# Patient Record
Sex: FEMALE | Race: BLACK OR AFRICAN AMERICAN | NOT HISPANIC OR LATINO | Employment: FULL TIME | URBAN - METROPOLITAN AREA
[De-identification: names, ages, dates, MRNs, and addresses within clinical notes are randomized per-mention and may not be internally consistent; named-entity substitution may affect disease eponyms.]

---

## 2017-05-25 ENCOUNTER — HOSPITAL ENCOUNTER (EMERGENCY)
Facility: HOSPITAL | Age: 42
Discharge: HOME | End: 2017-05-25
Attending: EMERGENCY MEDICINE
Payer: MEDICAID

## 2017-05-25 VITALS
HEART RATE: 85 BPM | HEIGHT: 64 IN | DIASTOLIC BLOOD PRESSURE: 90 MMHG | RESPIRATION RATE: 16 BRPM | WEIGHT: 200 LBS | TEMPERATURE: 99 F | SYSTOLIC BLOOD PRESSURE: 135 MMHG | BODY MASS INDEX: 34.15 KG/M2 | OXYGEN SATURATION: 100 %

## 2017-05-25 DIAGNOSIS — M25.512 ACUTE PAIN OF LEFT SHOULDER: ICD-10-CM
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
## 2017-05-25 DIAGNOSIS — M54.2 NECK PAIN: ICD-10-CM
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
## 2017-05-25 DIAGNOSIS — T30.4 CHEMICAL BURN OF SKIN: ICD-10-CM

## 2017-05-25 DIAGNOSIS — S30.1XXA CONTUSION OF ABDOMINAL WALL, INITIAL ENCOUNTER: ICD-10-CM

## 2017-05-25 DIAGNOSIS — S16.1XXA CERVICAL MUSCLE STRAIN, INITIAL ENCOUNTER: Primary | ICD-10-CM

## 2017-05-25 LAB
B-HCG UR QL: NEGATIVE
CTP QC/QA: YES

## 2017-05-25 PROCEDURE — 25000003 PHARM REV CODE 250: Performed by: PHYSICIAN ASSISTANT

## 2017-05-25 PROCEDURE — 99284 EMERGENCY DEPT VISIT MOD MDM: CPT

## 2017-05-25 PROCEDURE — 81025 URINE PREGNANCY TEST: CPT | Performed by: PHYSICIAN ASSISTANT

## 2017-05-25 RX ORDER — METHOCARBAMOL 500 MG/1
500 TABLET, FILM COATED ORAL
Status: COMPLETED | OUTPATIENT
Start: 2017-05-25 | End: 2017-05-25

## 2017-05-25 RX ORDER — IBUPROFEN 600 MG/1
600 TABLET ORAL EVERY 6 HOURS PRN
Qty: 20 TABLET | Refills: 0 | Status: SHIPPED | OUTPATIENT
Start: 2017-05-25 | End: 2019-02-28

## 2017-05-25 RX ORDER — CYCLOBENZAPRINE HCL 10 MG
10 TABLET ORAL 3 TIMES DAILY PRN
Qty: 15 TABLET | Refills: 0 | Status: SHIPPED | OUTPATIENT
Start: 2017-05-25 | End: 2017-05-30

## 2017-05-25 RX ORDER — IBUPROFEN 600 MG/1
600 TABLET ORAL
Status: COMPLETED | OUTPATIENT
Start: 2017-05-25 | End: 2017-05-25

## 2017-05-25 RX ADMIN — IBUPROFEN 600 MG: 600 TABLET, FILM COATED ORAL at 02:05

## 2017-05-25 RX ADMIN — METHOCARBAMOL 500 MG: 500 TABLET ORAL at 02:05
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
## 2019-02-28 ENCOUNTER — HOSPITAL ENCOUNTER (EMERGENCY)
Facility: HOSPITAL | Age: 44
Discharge: HOME | End: 2019-02-28
Attending: EMERGENCY MEDICINE
Payer: MEDICAID

## 2019-02-28 VITALS
HEART RATE: 89 BPM | BODY MASS INDEX: 28 KG/M2 | WEIGHT: 164 LBS | HEIGHT: 64 IN | TEMPERATURE: 99 F | RESPIRATION RATE: 19 BRPM | OXYGEN SATURATION: 99 % | SYSTOLIC BLOOD PRESSURE: 150 MMHG | DIASTOLIC BLOOD PRESSURE: 78 MMHG

## 2019-02-28 DIAGNOSIS — B34.9 VIRAL SYNDROME: Primary | ICD-10-CM

## 2019-02-28 LAB
B-HCG UR QL: NEGATIVE
CTP QC/QA: YES
CTP QC/QA: YES
DEPRECATED S PYO AG THROAT QL EIA: NEGATIVE
FLUAV AG NPH QL: NEGATIVE
FLUBV AG NPH QL: NEGATIVE

## 2019-02-28 PROCEDURE — 25000003 PHARM REV CODE 250: Performed by: PHYSICIAN ASSISTANT

## 2019-02-28 PROCEDURE — 81025 URINE PREGNANCY TEST: CPT | Performed by: PHYSICIAN ASSISTANT

## 2019-02-28 PROCEDURE — 63600175 PHARM REV CODE 636 W HCPCS: Performed by: PHYSICIAN ASSISTANT

## 2019-02-28 PROCEDURE — 96372 THER/PROPH/DIAG INJ SC/IM: CPT

## 2019-02-28 PROCEDURE — 99284 EMERGENCY DEPT VISIT MOD MDM: CPT | Mod: 25

## 2019-02-28 PROCEDURE — 87880 STREP A ASSAY W/OPTIC: CPT

## 2019-02-28 PROCEDURE — 87081 CULTURE SCREEN ONLY: CPT

## 2019-02-28 PROCEDURE — 87804 INFLUENZA ASSAY W/OPTIC: CPT | Mod: 59

## 2019-02-28 RX ORDER — DEXAMETHASONE SODIUM PHOSPHATE 4 MG/ML
12 INJECTION, SOLUTION INTRA-ARTICULAR; INTRALESIONAL; INTRAMUSCULAR; INTRAVENOUS; SOFT TISSUE
Status: COMPLETED | OUTPATIENT
Start: 2019-02-28 | End: 2019-02-28

## 2019-02-28 RX ORDER — LORATADINE 10 MG/1
10 TABLET ORAL DAILY
Qty: 30 TABLET | Refills: 0 | Status: SHIPPED | OUTPATIENT
Start: 2019-02-28 | End: 2020-08-07

## 2019-02-28 RX ORDER — AZELASTINE 1 MG/ML
1 SPRAY, METERED NASAL 2 TIMES DAILY
Qty: 30 ML | Refills: 0 | Status: SHIPPED | OUTPATIENT
Start: 2019-02-28 | End: 2021-01-20

## 2019-02-28 RX ORDER — GUAIFENESIN/DEXTROMETHORPHAN 100-10MG/5
10 SYRUP ORAL 4 TIMES DAILY PRN
Qty: 120 ML | Refills: 0 | Status: SHIPPED | OUTPATIENT
Start: 2019-02-28 | End: 2019-03-10

## 2019-02-28 RX ORDER — IBUPROFEN 600 MG/1
600 TABLET ORAL EVERY 6 HOURS PRN
Qty: 20 TABLET | Refills: 0 | Status: SHIPPED | OUTPATIENT
Start: 2019-02-28 | End: 2020-08-07

## 2019-02-28 RX ADMIN — Medication 10 ML: at 09:02

## 2019-02-28 RX ADMIN — DEXAMETHASONE SODIUM PHOSPHATE 12 MG: 4 INJECTION, SOLUTION INTRAMUSCULAR; INTRAVENOUS at 09:02

## 2019-02-28 NOTE — ED PROVIDER NOTES
Encounter Date: 2/28/2019       History     Chief Complaint   Patient presents with    Cough     since Saturday with headache; nasal congestion; bodyaches and sore throat     44-year-old female with no significant past medical history on file presents to ED complaining of bitemporal headache, nasal congestion, rhinorrhea, cough productive of yellow/brown sputum, sore throat, myalgias.  Patient states she began on Saturday with a bitemporal headache and some nasal congestion.  Over the course the week she developed a productive cough, myalgias.  She admits to chills without fever.  She is also complaining of sore throat, odynophagia without dysphagia.  No chest pain.  No shortness of breath or dyspnea on exertion.  No abdominal pain.  No urinary complaints. Symptoms are acute, constant, severity 5/10.  No neck pain or stiffness. No recent illness.  No known sick contacts.  No recent hospitalization.  No recent antibiotics.    No history DVT or PE.  No unilateral leg swelling or pain.          Review of patient's allergies indicates:  No Known Allergies  History reviewed. No pertinent past medical history.  History reviewed. No pertinent surgical history.  History reviewed. No pertinent family history.  Social History     Tobacco Use    Smoking status: Never Smoker    Smokeless tobacco: Never Used   Substance Use Topics    Alcohol use: No    Drug use: No     Review of Systems   Constitutional: Positive for chills. Negative for fever.   HENT: Positive for congestion, rhinorrhea and sore throat. Negative for ear discharge, ear pain, facial swelling and trouble swallowing.    Eyes: Negative.  Negative for photophobia, pain, discharge, redness and visual disturbance.   Respiratory: Positive for cough (Productive). Negative for shortness of breath and wheezing.    Cardiovascular: Negative for chest pain, palpitations and leg swelling.   Gastrointestinal: Negative for abdominal pain, nausea and vomiting.   Endocrine:  Negative.    Genitourinary: Negative for dysuria, flank pain, frequency, hematuria, pelvic pain, vaginal bleeding, vaginal discharge and vaginal pain.   Musculoskeletal: Positive for myalgias. Negative for back pain, neck pain and neck stiffness.   Skin: Negative for rash.   Neurological: Positive for headaches. Negative for dizziness, weakness and light-headedness.   Hematological: Does not bruise/bleed easily.   Psychiatric/Behavioral: Negative.    All other systems reviewed and are negative.      Physical Exam     Initial Vitals [02/28/19 0815]   BP Pulse Resp Temp SpO2   (!) 144/84 90 20 98.9 °F (37.2 °C) 99 %      MAP       --         Physical Exam    Nursing note and vitals reviewed.  Constitutional: She appears well-developed and well-nourished. She is not diaphoretic. No distress.   She is well-appearing and nontoxic.  Vitals are reassuring.  She does speak with a hoarse voice.   HENT:   Head: Normocephalic and atraumatic.   There is posterior or pharyngeal erythema.  There is tonsillar erythema without significant swelling or exudate.  No asymmetry. No uvular deviation.  Airway patent without stridor.  There is mild anterior cervical lymphadenopathy bilaterally.  Neck remains supple.  Bilateral TMs and ear canals within normal limits.   Eyes: Conjunctivae and EOM are normal. Pupils are equal, round, and reactive to light.   Neck: Normal range of motion. Neck supple. No tracheal deviation present.   Cardiovascular: Intact distal pulses.   Pulmonary/Chest: Breath sounds normal. No stridor. No respiratory distress. She has no wheezes. She has no rhonchi. She exhibits no tenderness.   No hypoxia on room air.  No tachypnea.  No accessory muscle use.   Abdominal: Soft. Bowel sounds are normal. She exhibits no distension. There is no tenderness.   Musculoskeletal: Normal range of motion. She exhibits no tenderness.   Lymphadenopathy:     She has no cervical adenopathy.   Neurological: She is alert and oriented to  person, place, and time. GCS score is 15. GCS eye subscore is 4. GCS verbal subscore is 5. GCS motor subscore is 6.    strength equal bilaterally. Biceps, triceps, hip flexion, knee flexion, knee extension grossly equal bilaterally. Normal finger to nose bilaterally. No pronator drift, negative Romberg.  Normal rapid alternating movement.  Normal, steady gait.   Skin: Skin is warm and dry. Capillary refill takes less than 2 seconds.   Psychiatric: She has a normal mood and affect. Her behavior is normal. Judgment and thought content normal.         ED Course   Procedures  Labs Reviewed   THROAT SCREEN, RAPID   CULTURE, STREP A,  THROAT   POCT INFLUENZA A/B   POCT URINE PREGNANCY          Imaging Results    None          Medical Decision Making:   ED Management:  Differential includes viral illness, pharyngitis, pneumonia, bronchitis, meningitis, headache disorder unspecified, dehydration    Likely viral illness.  Pharynx with some erythema.  Centor score 1.  Lungs clear.  No respiratory distress.  Vitals reassuring.  Bitemporal headache without any focal neurologic deficits.  Likely related to viral illness.  Lying will manage supportively as an outpatient, have her follow up with her primary.  I discussed strict return precautions.  She does understand and agree.                      Clinical Impression:       ICD-10-CM ICD-9-CM   1. Viral syndrome B34.9 079.99         Disposition:   Disposition: Discharged  Condition: Stable                        Grabiel Juarez PA-C  02/28/19 [id number]

## 2019-02-28 NOTE — DISCHARGE INSTRUCTIONS
Drink lots of fluids, stay well hydrated. Tylenol/Ibuprofen as needed for discomfort; go back and forth between these two medications as needed for temp greater than 100.4F. Zyrtec daily. Astelin for congestion. Robitussin for cough. Follow-up with primary care provider for reevaluation, further recommendations. Return to this ED if unable to treat fever, if symptoms persist or worsen despite treatment, if you begin with shortness of breath or difficulty breathing, if any other problems occur.

## 2019-02-28 NOTE — ED TRIAGE NOTES
The pt states she started with a headache on last Saturday. Reports the cough, sore throat and runny nose started on Tuesday. Says she felt warm.

## 2019-03-02 LAB — BACTERIA THROAT CULT: NORMAL

## 2020-02-13 ENCOUNTER — HOSPITAL ENCOUNTER (EMERGENCY)
Facility: HOSPITAL | Age: 45
Discharge: HOME | End: 2020-02-13
Attending: EMERGENCY MEDICINE
Payer: COMMERCIAL

## 2020-02-13 VITALS
BODY MASS INDEX: 25.75 KG/M2 | WEIGHT: 150 LBS | OXYGEN SATURATION: 100 % | DIASTOLIC BLOOD PRESSURE: 91 MMHG | TEMPERATURE: 98 F | RESPIRATION RATE: 16 BRPM | HEART RATE: 81 BPM | SYSTOLIC BLOOD PRESSURE: 149 MMHG

## 2020-02-13 DIAGNOSIS — S30.1XXA CONTUSION OF FLANK, INITIAL ENCOUNTER: ICD-10-CM

## 2020-02-13 DIAGNOSIS — S20.219A CONTUSION OF CHEST WALL, UNSPECIFIED LATERALITY, INITIAL ENCOUNTER: Primary | ICD-10-CM

## 2020-02-13 DIAGNOSIS — S46.812A TRAPEZIUS STRAIN, LEFT, INITIAL ENCOUNTER: ICD-10-CM
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
## 2020-02-13 LAB
B-HCG UR QL: NEGATIVE
CTP QC/QA: YES

## 2020-02-13 PROCEDURE — 63600175 PHARM REV CODE 636 W HCPCS: Performed by: PHYSICIAN ASSISTANT

## 2020-02-13 PROCEDURE — 99284 EMERGENCY DEPT VISIT MOD MDM: CPT | Mod: 25

## 2020-02-13 PROCEDURE — 81025 URINE PREGNANCY TEST: CPT | Performed by: EMERGENCY MEDICINE

## 2020-02-13 PROCEDURE — 25000003 PHARM REV CODE 250: Performed by: PHYSICIAN ASSISTANT

## 2020-02-13 PROCEDURE — 96372 THER/PROPH/DIAG INJ SC/IM: CPT

## 2020-02-13 RX ORDER — KETOROLAC TROMETHAMINE 30 MG/ML
30 INJECTION, SOLUTION INTRAMUSCULAR; INTRAVENOUS
Status: COMPLETED | OUTPATIENT
Start: 2020-02-13 | End: 2020-02-13

## 2020-02-13 RX ORDER — LIDOCAINE 50 MG/G
1 PATCH TOPICAL DAILY
Qty: 6 PATCH | Refills: 0 | Status: SHIPPED | OUTPATIENT
Start: 2020-02-13 | End: 2020-08-07

## 2020-02-13 RX ORDER — ORPHENADRINE CITRATE 100 MG/1
100 TABLET, EXTENDED RELEASE ORAL 2 TIMES DAILY
Qty: 8 TABLET | Refills: 0 | Status: SHIPPED | OUTPATIENT
Start: 2020-02-13 | End: 2020-02-17

## 2020-02-13 RX ORDER — MELOXICAM 7.5 MG/1
7.5 TABLET ORAL DAILY
Qty: 12 TABLET | Refills: 0 | Status: SHIPPED | OUTPATIENT
Start: 2020-02-13 | End: 2020-08-07

## 2020-02-13 RX ORDER — ACETAMINOPHEN 500 MG
1000 TABLET ORAL
Status: COMPLETED | OUTPATIENT
Start: 2020-02-13 | End: 2020-02-13

## 2020-02-13 RX ADMIN — KETOROLAC TROMETHAMINE 30 MG: 30 INJECTION, SOLUTION INTRAMUSCULAR at 07:02

## 2020-02-13 RX ADMIN — ACETAMINOPHEN 1000 MG: 500 TABLET ORAL at 07:02
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
## 2020-02-14 NOTE — DISCHARGE INSTRUCTIONS
Your feedback is important to us.  If you should receive a survey in the next few days, please share your experience with us.        Thank you for coming to our Emergency Department today. It is important to remember that some problems are difficult to diagnose and may not be found during your first visit. Be sure to follow up with your primary care doctor.    Return to the ER with any questions/concerns, new/concerning symptoms, worsening or failure to improve. Do not drive or make any important decisions for 24 hours if you have received any pain medications, sedatives or mood altering drugs during your ER visit.
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
## 2020-08-07 ENCOUNTER — OFFICE VISIT (OUTPATIENT)
Dept: FAMILY MEDICINE | Facility: CLINIC | Age: 45
End: 2020-08-07
Payer: COMMERCIAL

## 2020-08-07 VITALS
RESPIRATION RATE: 16 BRPM | BODY MASS INDEX: 28.38 KG/M2 | SYSTOLIC BLOOD PRESSURE: 120 MMHG | WEIGHT: 166.25 LBS | DIASTOLIC BLOOD PRESSURE: 86 MMHG | HEIGHT: 64 IN | OXYGEN SATURATION: 98 % | TEMPERATURE: 100 F | HEART RATE: 97 BPM

## 2020-08-07 DIAGNOSIS — Z76.89 ESTABLISHING CARE WITH NEW DOCTOR, ENCOUNTER FOR: ICD-10-CM

## 2020-08-07 DIAGNOSIS — R92.8 ABNORMAL MAMMOGRAM OF LEFT BREAST: ICD-10-CM

## 2020-08-07 DIAGNOSIS — Z00.00 ROUTINE GENERAL MEDICAL EXAMINATION AT HEALTH CARE FACILITY: ICD-10-CM

## 2020-08-07 PROCEDURE — 99999 PR PBB SHADOW E&M-EST. PATIENT-LVL IV: ICD-10-PCS | Mod: PBBFAC,,, | Performed by: INTERNAL MEDICINE

## 2020-08-07 PROCEDURE — 3008F PR BODY MASS INDEX (BMI) DOCUMENTED: ICD-10-PCS | Mod: CPTII,S$GLB,, | Performed by: INTERNAL MEDICINE

## 2020-08-07 PROCEDURE — 99204 PR OFFICE/OUTPT VISIT, NEW, LEVL IV, 45-59 MIN: ICD-10-PCS | Mod: S$GLB,,, | Performed by: INTERNAL MEDICINE

## 2020-08-07 PROCEDURE — 99999 PR PBB SHADOW E&M-EST. PATIENT-LVL IV: CPT | Mod: PBBFAC,,, | Performed by: INTERNAL MEDICINE

## 2020-08-07 PROCEDURE — 99204 OFFICE O/P NEW MOD 45 MIN: CPT | Mod: S$GLB,,, | Performed by: INTERNAL MEDICINE

## 2020-08-07 PROCEDURE — 3008F BODY MASS INDEX DOCD: CPT | Mod: CPTII,S$GLB,, | Performed by: INTERNAL MEDICINE

## 2020-08-07 NOTE — PROGRESS NOTES
Subjective:       Patient ID: Karey Palmer is a pleasant 45 y.o. Black or  female patient    Chief Complaint: Orders (diagnostic mammogram - due to abdominal mammogram from [facility] )      Patient is a new pt to me and to our practice.    HPI    She comes may need today because she needs an order for a diagnostic mammography to be done at [facility] as she had a screening one at [facility] with the need of additional imaging on left side but could not be done as the machine broke.  She would also like to organize an annual physical as well as be referred to OBGYN.        There is no problem list on file for this patient.         ACTIVE MEDICAL ISSUES:  Documented in Problem List     PAST MEDICAL HISTORY  Documented     PAST SURGICAL HISTORY:  Documented     SOCIAL HISTORY:  Documented     FAMILY HISTORY:  Documented     ALLERGIES AND MEDICATIONS: updated and reviewed.  Documented    Review of Systems   Constitutional: Negative for activity change, appetite change, fatigue and fever.   HENT: Negative for congestion, postnasal drip, rhinorrhea, sinus pressure and sore throat.    Eyes: Negative for pain, discharge and redness.   Respiratory: Negative for cough, chest tightness and shortness of breath.    Cardiovascular: Negative for chest pain, palpitations and leg swelling.   Gastrointestinal: Negative for abdominal pain, constipation and nausea.   Endocrine: Negative for cold intolerance and heat intolerance.   Genitourinary: Negative for difficulty urinating, flank pain, frequency, menstrual problem, pelvic pain, urgency and vaginal discharge.   Musculoskeletal: Negative for arthralgias, back pain, joint swelling and neck pain.   Skin: Negative for color change and rash.   Allergic/Immunologic: Negative for environmental allergies and food allergies.   Neurological: Negative for weakness, numbness and headaches.   Hematological: Negative for adenopathy.   Psychiatric/Behavioral: Negative for behavioral  "problems, hallucinations, sleep disturbance and suicidal ideas. The patient is not nervous/anxious.        Objective:      Physical Exam  Vitals signs and nursing note reviewed.   Constitutional:       Appearance: She is well-developed.   HENT:      Right Ear: External ear normal.      Left Ear: External ear normal.      Nose: Nose normal.   Eyes:      Conjunctiva/sclera: Conjunctivae normal.      Pupils: Pupils are equal, round, and reactive to light.   Neck:      Musculoskeletal: Normal range of motion and neck supple.      Thyroid: No thyromegaly.   Cardiovascular:      Rate and Rhythm: Normal rate and regular rhythm.      Heart sounds: Normal heart sounds.   Pulmonary:      Effort: Pulmonary effort is normal. No respiratory distress.      Breath sounds: Normal breath sounds. No wheezing.   Abdominal:      General: Bowel sounds are normal.      Palpations: Abdomen is soft. There is no mass.      Tenderness: There is no abdominal tenderness.   Genitourinary:     Vagina: No vaginal discharge.   Musculoskeletal: Normal range of motion.   Lymphadenopathy:      Cervical: No cervical adenopathy.   Skin:     General: Skin is warm and dry.   Neurological:      Mental Status: She is alert and oriented to person, place, and time.   Psychiatric:         Behavior: Behavior normal.         Thought Content: Thought content normal.         Judgment: Judgment normal.         Vitals:    08/07/20 1341   BP: 120/86   BP Location: Right arm   Patient Position: Sitting   BP Method: Large (Manual)   Pulse: 97   Resp: 16   Temp: 100.1 °F (37.8 °C)   TempSrc: Oral   SpO2: 98%   Weight: 75.4 kg (166 lb 3.6 oz)   Height: 5' 4" (1.626 m)     Body mass index is 28.53 kg/m².    RESULTS: all lab work in Epic.     Mammo screening 7/27/2020:        FINDINGS RIGHT BREAST: There are scattered areas of fibroglandular density.  The fibroglandular pattern is stable. There is no significant mass, suspicious microcalcifications, or secondary signs of " malignancy in right breast.        FINDINGS LEFT BREAST: There are scattered areas of fibroglandular density. The fibroglandular pattern is stable. At the 3 o'clock position deep in the medial left breast there is a question subtle spiculated lesion which was not evident on the previous mammogram. Recommend diagnostic mammogram with spot view and if the finding persists ultrasound for characterization.        Assessment:       1. Establishing care with new doctor, encounter for    2. Abnormal mammogram of left breast    3. Routine general medical examination at health care facility        Plan:   Karey was seen today for orders.    Diagnoses and all orders for this visit:    Establishing care with new doctor, encounter for    Will come and see me back in August for an annual physical.    Abnormal mammogram of left breast  -     Mammo Digital Diagnostic Left; Future    Had a screening mammo done at [facility] and is supposed to have additional imaging done for left breast, not possible as machine broke.  Ordered at our facility.    Routine general medical examination at health care facility  -     Ambulatory referral/consult to Obstetrics / Gynecology; Future  -     CBC auto differential; Future  -     Comprehensive metabolic panel; Future  -     Hemoglobin A1C; Future  -     TSH; Future  -     Lipid Panel; Future    Patient will do blood work before the next visit and will discuss the results as well as other preventative measures.  I will also refer her to OBGYN.    Follow up if symptoms worsen or fail to improve, for annual physical.    This note was created by combination of typed  and M-Modal dictation.  Transcription errors may be present.  If there are any questions, please contact me.

## 2020-08-10 ENCOUNTER — TELEPHONE (OUTPATIENT)
Dept: FAMILY MEDICINE | Facility: CLINIC | Age: 45
End: 2020-08-10

## 2020-08-10 NOTE — TELEPHONE ENCOUNTER
----- Message from Margarette Navarro MD sent at 8/7/2020  5:43 PM CDT -----  Regarding: LAB WORK  Orders placed for lab work that may be done from 8/10/2020 for annual visit on 8/17/2020. Thanks

## 2020-08-11 ENCOUNTER — TELEPHONE (OUTPATIENT)
Dept: FAMILY MEDICINE | Facility: CLINIC | Age: 45
End: 2020-08-11

## 2020-08-11 NOTE — TELEPHONE ENCOUNTER
I spoke with the patient regarding a referral to Gynecology, she states that she does not need to see GYN at the moment. Patient states she previously had a annual at [facility].

## 2020-08-12 ENCOUNTER — HOSPITAL ENCOUNTER (OUTPATIENT)
Dept: RADIOLOGY | Facility: HOSPITAL | Age: 45
Discharge: HOME | End: 2020-08-12
Attending: INTERNAL MEDICINE
Payer: COMMERCIAL

## 2020-08-12 VITALS — HEIGHT: 64 IN | WEIGHT: 166 LBS | BODY MASS INDEX: 28.34 KG/M2

## 2020-08-12 DIAGNOSIS — R92.8 ABNORMAL MAMMOGRAM OF LEFT BREAST: ICD-10-CM

## 2020-08-12 PROCEDURE — 77065 MAMMO DIGITAL DIAGNOSTIC LEFT WITH TOMOSYNTHESIS_CAD: ICD-10-PCS | Mod: 26,LT,, | Performed by: RADIOLOGY

## 2020-08-12 PROCEDURE — 77065 DX MAMMO INCL CAD UNI: CPT | Mod: TC,LT

## 2020-08-12 PROCEDURE — 77061 MAMMO DIGITAL DIAGNOSTIC LEFT WITH TOMOSYNTHESIS_CAD: ICD-10-PCS | Mod: 26,LT,, | Performed by: RADIOLOGY

## 2020-08-12 PROCEDURE — 77065 DX MAMMO INCL CAD UNI: CPT | Mod: 26,LT,, | Performed by: RADIOLOGY

## 2020-08-12 PROCEDURE — 77061 BREAST TOMOSYNTHESIS UNI: CPT | Mod: 26,LT,, | Performed by: RADIOLOGY

## 2021-01-20 ENCOUNTER — OFFICE VISIT (OUTPATIENT)
Dept: FAMILY MEDICINE | Facility: CLINIC | Age: 46
End: 2021-01-20
Payer: MEDICAID

## 2021-01-20 VITALS
TEMPERATURE: 100 F | WEIGHT: 161.38 LBS | SYSTOLIC BLOOD PRESSURE: 120 MMHG | OXYGEN SATURATION: 99 % | BODY MASS INDEX: 27.55 KG/M2 | HEART RATE: 75 BPM | DIASTOLIC BLOOD PRESSURE: 80 MMHG | HEIGHT: 64 IN | RESPIRATION RATE: 16 BRPM

## 2021-01-20 DIAGNOSIS — Z00.00 ANNUAL PHYSICAL EXAM: Primary | ICD-10-CM

## 2021-01-20 PROCEDURE — 99999 PR PBB SHADOW E&M-EST. PATIENT-LVL IV: ICD-10-PCS | Mod: PBBFAC,,, | Performed by: INTERNAL MEDICINE

## 2021-01-20 PROCEDURE — 99999 PR PBB SHADOW E&M-EST. PATIENT-LVL IV: CPT | Mod: PBBFAC,,, | Performed by: INTERNAL MEDICINE

## 2021-01-20 PROCEDURE — 99396 PREV VISIT EST AGE 40-64: CPT | Mod: S$PBB,,, | Performed by: INTERNAL MEDICINE

## 2021-01-20 PROCEDURE — 99214 OFFICE O/P EST MOD 30 MIN: CPT | Mod: PBBFAC,PO | Performed by: INTERNAL MEDICINE

## 2021-01-20 PROCEDURE — 99396 PR PREVENTIVE VISIT,EST,40-64: ICD-10-PCS | Mod: S$PBB,,, | Performed by: INTERNAL MEDICINE

## 2021-06-28 ENCOUNTER — HOSPITAL ENCOUNTER (EMERGENCY)
Facility: HOSPITAL | Age: 46
Discharge: HOME | End: 2021-06-28
Attending: EMERGENCY MEDICINE
Payer: MEDICAID

## 2021-06-28 VITALS
SYSTOLIC BLOOD PRESSURE: 164 MMHG | RESPIRATION RATE: 18 BRPM | HEART RATE: 85 BPM | BODY MASS INDEX: 27.83 KG/M2 | WEIGHT: 163 LBS | TEMPERATURE: 98 F | DIASTOLIC BLOOD PRESSURE: 91 MMHG | HEIGHT: 64 IN | OXYGEN SATURATION: 99 %

## 2021-06-28 DIAGNOSIS — S16.1XXA CERVICAL STRAIN, ACUTE, INITIAL ENCOUNTER: Primary | ICD-10-CM
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
## 2021-06-28 LAB
B-HCG UR QL: NEGATIVE
CTP QC/QA: YES

## 2021-06-28 PROCEDURE — 25000003 PHARM REV CODE 250: Performed by: EMERGENCY MEDICINE

## 2021-06-28 PROCEDURE — 99283 EMERGENCY DEPT VISIT LOW MDM: CPT | Mod: 25

## 2021-06-28 RX ORDER — IBUPROFEN 600 MG/1
600 TABLET ORAL
Status: COMPLETED | OUTPATIENT
Start: 2021-06-28 | End: 2021-06-28

## 2021-06-28 RX ORDER — IBUPROFEN 600 MG/1
600 TABLET ORAL EVERY 6 HOURS PRN
Qty: 20 TABLET | Refills: 0 | Status: ON HOLD | OUTPATIENT
Start: 2021-06-28 | End: 2023-01-13 | Stop reason: HOSPADM

## 2021-06-28 RX ADMIN — IBUPROFEN 600 MG: 600 TABLET ORAL at 10:06

## 2021-07-01 ENCOUNTER — HOSPITAL ENCOUNTER (EMERGENCY)
Facility: HOSPITAL | Age: 46
Discharge: HOME | End: 2021-07-01
Attending: EMERGENCY MEDICINE
Payer: MEDICAID

## 2021-07-01 VITALS
OXYGEN SATURATION: 100 % | RESPIRATION RATE: 18 BRPM | DIASTOLIC BLOOD PRESSURE: 97 MMHG | HEART RATE: 79 BPM | SYSTOLIC BLOOD PRESSURE: 185 MMHG | TEMPERATURE: 99 F | HEIGHT: 64 IN | WEIGHT: 164 LBS | BODY MASS INDEX: 28 KG/M2
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
## 2021-07-01 DIAGNOSIS — R03.0 ELEVATED BLOOD PRESSURE READING: Primary | ICD-10-CM

## 2021-07-01 LAB
B-HCG UR QL: NEGATIVE
CTP QC/QA: YES

## 2021-07-01 PROCEDURE — 99284 EMERGENCY DEPT VISIT MOD MDM: CPT

## 2021-07-01 PROCEDURE — 25000003 PHARM REV CODE 250: Performed by: NURSE PRACTITIONER

## 2021-07-01 PROCEDURE — 81025 URINE PREGNANCY TEST: CPT | Performed by: EMERGENCY MEDICINE

## 2021-07-01 RX ORDER — NEBULIZER AND COMPRESSOR
1 EACH MISCELLANEOUS DAILY
Qty: 1 EACH | Refills: 0 | Status: ON HOLD | OUTPATIENT
Start: 2021-07-01 | End: 2023-01-13 | Stop reason: HOSPADM

## 2021-07-01 RX ORDER — LIDOCAINE 50 MG/G
1 PATCH TOPICAL DAILY
Qty: 15 PATCH | Refills: 0 | Status: ON HOLD | OUTPATIENT
Start: 2021-07-01 | End: 2023-01-13 | Stop reason: HOSPADM

## 2021-07-01 RX ORDER — ORPHENADRINE CITRATE 100 MG/1
100 TABLET, EXTENDED RELEASE ORAL 2 TIMES DAILY PRN
Qty: 20 TABLET | Refills: 0 | Status: SHIPPED | OUTPATIENT
Start: 2021-07-01 | End: 2021-07-11

## 2021-07-01 RX ORDER — LIDOCAINE 50 MG/G
1 PATCH TOPICAL
Status: DISCONTINUED | OUTPATIENT
Start: 2021-07-01 | End: 2021-07-01 | Stop reason: HOSPADM

## 2021-07-01 RX ORDER — ACETAMINOPHEN 325 MG/1
650 TABLET ORAL
Status: COMPLETED | OUTPATIENT
Start: 2021-07-01 | End: 2021-07-01

## 2021-07-01 RX ADMIN — LIDOCAINE 1 PATCH: 50 PATCH TOPICAL at 09:07

## 2021-07-01 RX ADMIN — ACETAMINOPHEN 650 MG: 325 TABLET ORAL at 09:07

## 2021-07-16 ENCOUNTER — HOSPITAL ENCOUNTER (EMERGENCY)
Facility: HOSPITAL | Age: 46
Discharge: HOME | End: 2021-07-17
Attending: EMERGENCY MEDICINE
Payer: MEDICAID

## 2021-07-16 DIAGNOSIS — J04.0 LARYNGITIS: ICD-10-CM

## 2021-07-16 DIAGNOSIS — R05.9 COUGH: ICD-10-CM

## 2021-07-16 DIAGNOSIS — J06.9 VIRAL URI WITH COUGH: Primary | ICD-10-CM

## 2021-07-16 LAB
B-HCG UR QL: NEGATIVE
CTP QC/QA: YES

## 2021-07-16 PROCEDURE — 99283 EMERGENCY DEPT VISIT LOW MDM: CPT

## 2021-07-16 PROCEDURE — 81025 URINE PREGNANCY TEST: CPT | Performed by: PHYSICIAN ASSISTANT

## 2021-07-16 PROCEDURE — U0002 COVID-19 LAB TEST NON-CDC: HCPCS | Performed by: PHYSICIAN ASSISTANT

## 2021-07-16 PROCEDURE — 25000003 PHARM REV CODE 250: Performed by: PHYSICIAN ASSISTANT

## 2021-07-16 RX ADMIN — BENZOCAINE: 200 SPRAY DENTAL; ORAL; PERIODONTAL at 11:07

## 2021-07-17 VITALS
DIASTOLIC BLOOD PRESSURE: 94 MMHG | TEMPERATURE: 97 F | BODY MASS INDEX: 28 KG/M2 | RESPIRATION RATE: 18 BRPM | WEIGHT: 164 LBS | HEIGHT: 64 IN | HEART RATE: 89 BPM | OXYGEN SATURATION: 100 % | SYSTOLIC BLOOD PRESSURE: 165 MMHG

## 2021-07-17 LAB
CTP QC/QA: YES
CTP QC/QA: YES
MOLECULAR STREP A: NEGATIVE
SARS-COV-2 RDRP RESP QL NAA+PROBE: NEGATIVE

## 2021-07-17 RX ORDER — PROMETHAZINE HYDROCHLORIDE AND DEXTROMETHORPHAN HYDROBROMIDE 6.25; 15 MG/5ML; MG/5ML
5 SYRUP ORAL NIGHTLY PRN
Qty: 118 ML | Refills: 0 | Status: SHIPPED | OUTPATIENT
Start: 2021-07-17 | End: 2021-07-27

## 2021-07-21 ENCOUNTER — OCCUPATIONAL HEALTH (OUTPATIENT)
Dept: URGENT CARE | Facility: CLINIC | Age: 46
End: 2021-07-21

## 2021-07-21 DIAGNOSIS — Z02.1 PRE-EMPLOYMENT EXAMINATION: Primary | ICD-10-CM

## 2021-07-21 PROCEDURE — 99499 UNLISTED E&M SERVICE: CPT | Mod: S$GLB,,, | Performed by: NURSE PRACTITIONER

## 2021-07-21 PROCEDURE — 99499 DOT PHYSICAL: ICD-10-PCS | Mod: S$GLB,,, | Performed by: NURSE PRACTITIONER

## 2021-08-06 ENCOUNTER — IMMUNIZATION (OUTPATIENT)
Dept: PRIMARY CARE CLINIC | Facility: CLINIC | Age: 46
End: 2021-08-06
Payer: MEDICAID

## 2021-08-06 DIAGNOSIS — Z23 NEED FOR VACCINATION: Primary | ICD-10-CM

## 2021-08-06 PROCEDURE — 0001A COVID-19, MRNA, LNP-S, PF, 30 MCG/0.3 ML DOSE VACCINE: ICD-10-PCS | Mod: CV19,S$GLB,, | Performed by: INTERNAL MEDICINE

## 2021-08-06 PROCEDURE — 0001A COVID-19, MRNA, LNP-S, PF, 30 MCG/0.3 ML DOSE VACCINE: CPT | Mod: CV19,S$GLB,, | Performed by: INTERNAL MEDICINE

## 2021-08-06 PROCEDURE — 91300 COVID-19, MRNA, LNP-S, PF, 30 MCG/0.3 ML DOSE VACCINE: CPT | Mod: S$GLB,,, | Performed by: INTERNAL MEDICINE

## 2021-08-06 PROCEDURE — 91300 COVID-19, MRNA, LNP-S, PF, 30 MCG/0.3 ML DOSE VACCINE: ICD-10-PCS | Mod: S$GLB,,, | Performed by: INTERNAL MEDICINE

## 2021-08-18 DIAGNOSIS — Z12.31 OTHER SCREENING MAMMOGRAM: ICD-10-CM

## 2021-08-27 ENCOUNTER — IMMUNIZATION (OUTPATIENT)
Dept: PRIMARY CARE CLINIC | Facility: CLINIC | Age: 46
End: 2021-08-27
Payer: MEDICAID

## 2021-08-27 DIAGNOSIS — Z23 NEED FOR VACCINATION: Primary | ICD-10-CM

## 2021-08-27 PROCEDURE — 91300 COVID-19, MRNA, LNP-S, PF, 30 MCG/0.3 ML DOSE VACCINE: CPT | Mod: S$GLB,,, | Performed by: INTERNAL MEDICINE

## 2021-08-27 PROCEDURE — 91300 COVID-19, MRNA, LNP-S, PF, 30 MCG/0.3 ML DOSE VACCINE: ICD-10-PCS | Mod: S$GLB,,, | Performed by: INTERNAL MEDICINE

## 2021-08-27 PROCEDURE — 0002A COVID-19, MRNA, LNP-S, PF, 30 MCG/0.3 ML DOSE VACCINE: ICD-10-PCS | Mod: CV19,S$GLB,, | Performed by: INTERNAL MEDICINE

## 2021-08-27 PROCEDURE — 0002A COVID-19, MRNA, LNP-S, PF, 30 MCG/0.3 ML DOSE VACCINE: CPT | Mod: CV19,S$GLB,, | Performed by: INTERNAL MEDICINE

## 2021-11-22 ENCOUNTER — TELEPHONE (OUTPATIENT)
Dept: FAMILY MEDICINE | Facility: CLINIC | Age: 46
End: 2021-11-22
Payer: MEDICAID

## 2021-11-22 DIAGNOSIS — D22.9 SKIN MOLE: Primary | ICD-10-CM

## 2021-11-30 ENCOUNTER — TELEPHONE (OUTPATIENT)
Dept: FAMILY MEDICINE | Facility: CLINIC | Age: 46
End: 2021-11-30
Payer: MEDICAID

## 2022-03-21 ENCOUNTER — OFFICE VISIT (OUTPATIENT)
Dept: FAMILY MEDICINE | Facility: CLINIC | Age: 47
End: 2022-03-21
Payer: MEDICAID

## 2022-03-21 ENCOUNTER — LAB VISIT (OUTPATIENT)
Dept: LAB | Facility: HOSPITAL | Age: 47
End: 2022-03-21
Attending: INTERNAL MEDICINE
Payer: MEDICAID

## 2022-03-21 DIAGNOSIS — Z12.12 ENCOUNTER FOR COLORECTAL CANCER SCREENING: ICD-10-CM

## 2022-03-21 DIAGNOSIS — Z00.00 ENCOUNTER FOR ANNUAL PHYSICAL EXAM: Primary | ICD-10-CM

## 2022-03-21 DIAGNOSIS — Z11.3 SCREENING EXAMINATION FOR STD (SEXUALLY TRANSMITTED DISEASE): ICD-10-CM

## 2022-03-21 DIAGNOSIS — Z12.11 ENCOUNTER FOR COLORECTAL CANCER SCREENING: ICD-10-CM

## 2022-03-21 DIAGNOSIS — Z12.31 ENCOUNTER FOR SCREENING MAMMOGRAM FOR MALIGNANT NEOPLASM OF BREAST: ICD-10-CM

## 2022-03-21 DIAGNOSIS — Z00.00 ENCOUNTER FOR ANNUAL PHYSICAL EXAM: ICD-10-CM

## 2022-03-21 LAB
ALBUMIN SERPL BCP-MCNC: 4 G/DL (ref 3.5–5.2)
ALP SERPL-CCNC: 50 U/L (ref 55–135)
ALT SERPL W/O P-5'-P-CCNC: 16 U/L (ref 10–44)
ANION GAP SERPL CALC-SCNC: 10 MMOL/L (ref 8–16)
AST SERPL-CCNC: 19 U/L (ref 10–40)
BASOPHILS # BLD AUTO: 0.04 K/UL (ref 0–0.2)
BASOPHILS NFR BLD: 0.8 % (ref 0–1.9)
BILIRUB SERPL-MCNC: 0.7 MG/DL (ref 0.1–1)
BUN SERPL-MCNC: 7 MG/DL (ref 6–20)
CALCIUM SERPL-MCNC: 9.4 MG/DL (ref 8.7–10.5)
CHLORIDE SERPL-SCNC: 107 MMOL/L (ref 95–110)
CHOLEST SERPL-MCNC: 196 MG/DL (ref 120–199)
CHOLEST/HDLC SERPL: 2.6 {RATIO} (ref 2–5)
CO2 SERPL-SCNC: 24 MMOL/L (ref 23–29)
CREAT SERPL-MCNC: 0.8 MG/DL (ref 0.5–1.4)
DIFFERENTIAL METHOD: ABNORMAL
EOSINOPHIL # BLD AUTO: 0.1 K/UL (ref 0–0.5)
EOSINOPHIL NFR BLD: 1.4 % (ref 0–8)
ERYTHROCYTE [DISTWIDTH] IN BLOOD BY AUTOMATED COUNT: 13.4 % (ref 11.5–14.5)
EST. GFR  (AFRICAN AMERICAN): >60 ML/MIN/1.73 M^2
EST. GFR  (NON AFRICAN AMERICAN): >60 ML/MIN/1.73 M^2
ESTIMATED AVG GLUCOSE: 94 MG/DL (ref 68–131)
GLUCOSE SERPL-MCNC: 79 MG/DL (ref 70–110)
HBA1C MFR BLD: 4.9 % (ref 4–5.6)
HCT VFR BLD AUTO: 40.8 % (ref 37–48.5)
HDLC SERPL-MCNC: 75 MG/DL (ref 40–75)
HDLC SERPL: 38.3 % (ref 20–50)
HGB BLD-MCNC: 12.8 G/DL (ref 12–16)
IMM GRANULOCYTES # BLD AUTO: 0 K/UL (ref 0–0.04)
IMM GRANULOCYTES NFR BLD AUTO: 0 % (ref 0–0.5)
LDLC SERPL CALC-MCNC: 111.8 MG/DL (ref 63–159)
LYMPHOCYTES # BLD AUTO: 1.6 K/UL (ref 1–4.8)
LYMPHOCYTES NFR BLD: 32.9 % (ref 18–48)
MCH RBC QN AUTO: 28.2 PG (ref 27–31)
MCHC RBC AUTO-ENTMCNC: 31.4 G/DL (ref 32–36)
MCV RBC AUTO: 90 FL (ref 82–98)
MONOCYTES # BLD AUTO: 0.3 K/UL (ref 0.3–1)
MONOCYTES NFR BLD: 6.4 % (ref 4–15)
NEUTROPHILS # BLD AUTO: 2.9 K/UL (ref 1.8–7.7)
NEUTROPHILS NFR BLD: 58.5 % (ref 38–73)
NONHDLC SERPL-MCNC: 121 MG/DL
NRBC BLD-RTO: 0 /100 WBC
PLATELET # BLD AUTO: 302 K/UL (ref 150–450)
PMV BLD AUTO: 9.4 FL (ref 9.2–12.9)
POTASSIUM SERPL-SCNC: 3.8 MMOL/L (ref 3.5–5.1)
PROT SERPL-MCNC: 7 G/DL (ref 6–8.4)
RBC # BLD AUTO: 4.54 M/UL (ref 4–5.4)
SODIUM SERPL-SCNC: 141 MMOL/L (ref 136–145)
TRIGL SERPL-MCNC: 46 MG/DL (ref 30–150)
TSH SERPL DL<=0.005 MIU/L-ACNC: 0.86 UIU/ML (ref 0.4–4)
WBC # BLD AUTO: 4.99 K/UL (ref 3.9–12.7)

## 2022-03-21 PROCEDURE — 1160F PR REVIEW ALL MEDS BY PRESCRIBER/CLIN PHARMACIST DOCUMENTED: ICD-10-PCS | Mod: CPTII,,, | Performed by: INTERNAL MEDICINE

## 2022-03-21 PROCEDURE — 99999 PR PBB SHADOW E&M-EST. PATIENT-LVL IV: ICD-10-PCS | Mod: PBBFAC,,, | Performed by: INTERNAL MEDICINE

## 2022-03-21 PROCEDURE — 1159F MED LIST DOCD IN RCRD: CPT | Mod: CPTII,,, | Performed by: INTERNAL MEDICINE

## 2022-03-21 PROCEDURE — 1159F PR MEDICATION LIST DOCUMENTED IN MEDICAL RECORD: ICD-10-PCS | Mod: CPTII,,, | Performed by: INTERNAL MEDICINE

## 2022-03-21 PROCEDURE — 36415 COLL VENOUS BLD VENIPUNCTURE: CPT | Mod: PO | Performed by: INTERNAL MEDICINE

## 2022-03-21 PROCEDURE — 3044F HG A1C LEVEL LT 7.0%: CPT | Mod: CPTII,,, | Performed by: INTERNAL MEDICINE

## 2022-03-21 PROCEDURE — 99999 PR PBB SHADOW E&M-EST. PATIENT-LVL IV: CPT | Mod: PBBFAC,,, | Performed by: INTERNAL MEDICINE

## 2022-03-21 PROCEDURE — 3079F PR MOST RECENT DIASTOLIC BLOOD PRESSURE 80-89 MM HG: ICD-10-PCS | Mod: CPTII,,, | Performed by: INTERNAL MEDICINE

## 2022-03-21 PROCEDURE — 99214 OFFICE O/P EST MOD 30 MIN: CPT | Mod: PBBFAC,PO | Performed by: INTERNAL MEDICINE

## 2022-03-21 PROCEDURE — 3044F PR MOST RECENT HEMOGLOBIN A1C LEVEL <7.0%: ICD-10-PCS | Mod: CPTII,,, | Performed by: INTERNAL MEDICINE

## 2022-03-21 PROCEDURE — 80053 COMPREHEN METABOLIC PANEL: CPT | Performed by: INTERNAL MEDICINE

## 2022-03-21 PROCEDURE — 86592 SYPHILIS TEST NON-TREP QUAL: CPT | Performed by: INTERNAL MEDICINE

## 2022-03-21 PROCEDURE — 84443 ASSAY THYROID STIM HORMONE: CPT | Performed by: INTERNAL MEDICINE

## 2022-03-21 PROCEDURE — 87389 HIV-1 AG W/HIV-1&-2 AB AG IA: CPT | Performed by: INTERNAL MEDICINE

## 2022-03-21 PROCEDURE — 3008F PR BODY MASS INDEX (BMI) DOCUMENTED: ICD-10-PCS | Mod: CPTII,,, | Performed by: INTERNAL MEDICINE

## 2022-03-21 PROCEDURE — 99396 PREV VISIT EST AGE 40-64: CPT | Mod: S$PBB,,, | Performed by: INTERNAL MEDICINE

## 2022-03-21 PROCEDURE — 85025 COMPLETE CBC W/AUTO DIFF WBC: CPT | Performed by: INTERNAL MEDICINE

## 2022-03-21 PROCEDURE — 3079F DIAST BP 80-89 MM HG: CPT | Mod: CPTII,,, | Performed by: INTERNAL MEDICINE

## 2022-03-21 PROCEDURE — 83036 HEMOGLOBIN GLYCOSYLATED A1C: CPT | Performed by: INTERNAL MEDICINE

## 2022-03-21 PROCEDURE — 80061 LIPID PANEL: CPT | Performed by: INTERNAL MEDICINE

## 2022-03-21 PROCEDURE — 3008F BODY MASS INDEX DOCD: CPT | Mod: CPTII,,, | Performed by: INTERNAL MEDICINE

## 2022-03-21 PROCEDURE — 3075F SYST BP GE 130 - 139MM HG: CPT | Mod: CPTII,,, | Performed by: INTERNAL MEDICINE

## 2022-03-21 PROCEDURE — 1160F RVW MEDS BY RX/DR IN RCRD: CPT | Mod: CPTII,,, | Performed by: INTERNAL MEDICINE

## 2022-03-21 PROCEDURE — 3075F PR MOST RECENT SYSTOLIC BLOOD PRESS GE 130-139MM HG: ICD-10-PCS | Mod: CPTII,,, | Performed by: INTERNAL MEDICINE

## 2022-03-21 PROCEDURE — 80074 ACUTE HEPATITIS PANEL: CPT | Performed by: INTERNAL MEDICINE

## 2022-03-21 PROCEDURE — 99396 PR PREVENTIVE VISIT,EST,40-64: ICD-10-PCS | Mod: S$PBB,,, | Performed by: INTERNAL MEDICINE

## 2022-03-21 RX ORDER — FLUTICASONE PROPIONATE 50 MCG
1 SPRAY, SUSPENSION (ML) NASAL DAILY
COMMUNITY
Start: 2021-11-29 | End: 2023-01-13 | Stop reason: SDUPTHER

## 2022-03-21 NOTE — PROGRESS NOTES
Health Maintenance Due   Topic Date Due    TETANUS VACCINE  Never done    Colorectal Cancer Screening  Orders pending    Mammogram  08/12/2021 orders put in pt wants to schedule    Influenza Vaccine (1) Pt declines

## 2022-03-21 NOTE — PROGRESS NOTES
Subjective:       Patient ID: Karey Palmer is a pleasant 47 y.o. Black or  female patient    Chief Complaint: Annual Exam      Patient is a pt who comes today for an annual physical exam. I saw her last on 01/20/2021. See my last notes and the list of problems below.    HPI     In the interval:  - ED visits for URI, elevated BP, and cervical strain in June and July 2021, and Urgent Care on 12/24/2022 for UTI.  She reports feeling fine today, no specific health related issues. She has a good lifestyle.  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 Her twin daughters help her.  She would like to do usual blood work and STD screening at the same time. Due for mammography and colonoscopy.      There is no problem list on file for this patient.         ACTIVE MEDICAL ISSUES:  Documented in Problem List     PAST MEDICAL HISTORY  Documented     PAST SURGICAL HISTORY:  Documented     SOCIAL HISTORY:  Documented     FAMILY HISTORY:  Documented     ALLERGIES AND MEDICATIONS: updated and reviewed.  Documented    Review of Systems   Constitutional: Negative for activity change, appetite change, fatigue and fever.   HENT: Negative for congestion, postnasal drip, rhinorrhea, sinus pressure and sore throat.    Eyes: Negative for pain, discharge and redness.   Respiratory: Negative for cough, chest tightness and shortness of breath.    Cardiovascular: Negative for chest pain, palpitations and leg swelling.   Gastrointestinal: Negative for abdominal pain, constipation and nausea.   Endocrine: Negative for cold intolerance and heat intolerance.   Genitourinary: Negative for difficulty urinating, flank pain, frequency, menstrual problem, pelvic pain, urgency and vaginal discharge.   Musculoskeletal: Negative for arthralgias, back pain, joint swelling and neck pain.   Skin: Negative for  color change and rash.   Allergic/Immunologic: Negative for environmental allergies and food allergies.   Neurological: Negative for weakness, numbness and headaches.   Hematological: Negative for adenopathy.   Psychiatric/Behavioral: Negative for behavioral problems, hallucinations, sleep disturbance and suicidal ideas. The patient is not nervous/anxious.        Objective:      Physical Exam  Vitals and nursing note reviewed.   Constitutional:       Appearance: She is well-developed.   HENT:      Right Ear: Tympanic membrane and external ear normal.      Left Ear: Tympanic membrane and external ear normal.   Eyes:      Conjunctiva/sclera: Conjunctivae normal.   Neck:      Thyroid: No thyromegaly.   Cardiovascular:      Rate and Rhythm: Normal rate and regular rhythm.      Pulses: Normal pulses.      Heart sounds: Normal heart sounds.   Pulmonary:      Effort: Pulmonary effort is normal. No respiratory distress.      Breath sounds: Normal breath sounds. No wheezing.   Abdominal:      General: Bowel sounds are normal.      Palpations: Abdomen is soft. There is no mass.      Tenderness: There is no abdominal tenderness.   Musculoskeletal:         General: Normal range of motion.      Cervical back: Normal range of motion and neck supple.   Lymphadenopathy:      Cervical: No cervical adenopathy.   Skin:     General: Skin is warm and dry.   Neurological:      Mental Status: She is alert and oriented to person, place, and time. Mental status is at baseline.   Psychiatric:         Mood and Affect: Mood normal.         Behavior: Behavior normal.         Thought Content: Thought content normal.         Judgment: Judgment normal.         Vitals:    03/21/22 1136 03/21/22 1200   BP: (!) 140/88 130/80   BP Location: Right arm Left arm   Patient Position: Sitting Sitting   BP Method: Small (Manual) Medium (Manual)   Pulse: 88    Resp: 16    Temp: 97.8 °F (36.6 °C)    TempSrc: Oral    SpO2: 99%    Weight: 70.3 kg (154 lb 15.7  "oz)    Height: 5' 4" (1.626 m)      Body mass index is 26.6 kg/m².    RESULTS: Reviewed labs from last 12 months    Last Lab Results:     Lab Results   Component Value Date    WBC 4.99 03/21/2022    HGB 12.8 03/21/2022    HCT 40.8 03/21/2022     03/21/2022     03/21/2022    K 3.8 03/21/2022     03/21/2022    CO2 24 03/21/2022    BUN 7 03/21/2022    CREATININE 0.8 03/21/2022    CALCIUM 9.4 03/21/2022    ALBUMIN 4.0 03/21/2022    AST 19 03/21/2022    ALT 16 03/21/2022    CHOL 196 03/21/2022    TRIG 46 03/21/2022    HDL 75 03/21/2022    LDLCALC 111.8 03/21/2022    HGBA1C 4.9 03/21/2022    TSH 0.857 03/21/2022       Assessment:       1. Encounter for annual physical exam    2. Encounter for colorectal cancer screening    3. Screening examination for STD (sexually transmitted disease)    4. Encounter for screening mammogram for malignant neoplasm of breast        Plan:   Karey was seen today for annual exam.    Diagnoses and all orders for this visit:    Encounter for annual physical exam  -     CBC Auto Differential; Future  -     Comprehensive Metabolic Panel; Future  -     Hemoglobin A1C; Future  -     TSH; Future  -     Lipid Panel; Future    Good blood work results, no specific issue. Discussed preventative measures, great lifestyle, will order colono and mammo.    The 10-year ASCVD risk score ([place] MAUREEN Jr., et al., 2013) is: 0.7%    Values used to calculate the score:      Age: 47 years      Sex: Female      Is Non- : Yes      Diabetic: No      Tobacco smoker: No      Systolic Blood Pressure: 130 mmHg      Is BP treated: No      HDL Cholesterol: 75 mg/dL      Total Cholesterol: 196 mg/dL     Encounter for colorectal cancer screening  -     Case Request Endoscopy: COLONOSCOPY    Screening examination for STD (sexually transmitted disease)  -     HIV 1/2 Ag/Ab (4th Gen); Future  -     Hepatitis Panel, Acute; Future  -     RPR; Future    As per pt's request.    Encounter for " screening mammogram for malignant neoplasm of breast  -     Mammo Digital Screening Bilat; Future    No follow-ups on file.    This note was created by combination of typed  and M-Modal dictation.  Transcription errors may be present.  If there are any questions, please contact me.

## 2022-03-22 VITALS
HEART RATE: 88 BPM | SYSTOLIC BLOOD PRESSURE: 130 MMHG | WEIGHT: 155 LBS | TEMPERATURE: 98 F | OXYGEN SATURATION: 99 % | RESPIRATION RATE: 16 BRPM | DIASTOLIC BLOOD PRESSURE: 80 MMHG | HEIGHT: 64 IN | BODY MASS INDEX: 26.46 KG/M2

## 2022-03-22 LAB
HAV IGM SERPL QL IA: NEGATIVE
HBV CORE IGM SERPL QL IA: NEGATIVE
HBV SURFACE AG SERPL QL IA: NEGATIVE
HCV AB SERPL QL IA: NEGATIVE
RPR SER QL: NORMAL

## 2022-03-23 LAB — HIV 1+2 AB+HIV1 P24 AG SERPL QL IA: NEGATIVE

## 2022-04-05 ENCOUNTER — HOSPITAL ENCOUNTER (OUTPATIENT)
Dept: RADIOLOGY | Facility: HOSPITAL | Age: 47
Discharge: HOME | End: 2022-04-05
Attending: INTERNAL MEDICINE
Payer: MEDICAID

## 2022-04-05 VITALS — WEIGHT: 155 LBS | BODY MASS INDEX: 26.46 KG/M2 | HEIGHT: 64 IN

## 2022-04-05 DIAGNOSIS — Z12.31 ENCOUNTER FOR SCREENING MAMMOGRAM FOR MALIGNANT NEOPLASM OF BREAST: ICD-10-CM

## 2022-04-05 PROCEDURE — 77067 MAMMO DIGITAL SCREENING BILAT WITH TOMO: ICD-10-PCS | Mod: 26,,, | Performed by: RADIOLOGY

## 2022-04-05 PROCEDURE — 77063 BREAST TOMOSYNTHESIS BI: CPT | Mod: 26,,, | Performed by: RADIOLOGY

## 2022-04-05 PROCEDURE — 77063 MAMMO DIGITAL SCREENING BILAT WITH TOMO: ICD-10-PCS | Mod: 26,,, | Performed by: RADIOLOGY

## 2022-04-05 PROCEDURE — 77067 SCR MAMMO BI INCL CAD: CPT | Mod: TC

## 2022-04-05 PROCEDURE — 77063 BREAST TOMOSYNTHESIS BI: CPT | Mod: TC

## 2022-04-05 PROCEDURE — 77067 SCR MAMMO BI INCL CAD: CPT | Mod: 26,,, | Performed by: RADIOLOGY

## 2022-08-16 ENCOUNTER — OFFICE VISIT (OUTPATIENT)
Dept: FAMILY MEDICINE | Facility: CLINIC | Age: 47
End: 2022-08-16
Payer: MEDICAID

## 2022-08-16 VITALS
OXYGEN SATURATION: 99 % | HEIGHT: 64 IN | DIASTOLIC BLOOD PRESSURE: 105 MMHG | WEIGHT: 157.88 LBS | SYSTOLIC BLOOD PRESSURE: 157 MMHG | RESPIRATION RATE: 20 BRPM | BODY MASS INDEX: 26.95 KG/M2 | HEART RATE: 80 BPM | TEMPERATURE: 99 F

## 2022-08-16 DIAGNOSIS — I10 HYPERTENSION, ESSENTIAL, BENIGN: Primary | ICD-10-CM

## 2022-08-16 PROCEDURE — 99214 OFFICE O/P EST MOD 30 MIN: CPT | Mod: PBBFAC,PO | Performed by: PHYSICIAN ASSISTANT

## 2022-08-16 PROCEDURE — 99999 PR PBB SHADOW E&M-EST. PATIENT-LVL IV: CPT | Mod: PBBFAC,,, | Performed by: PHYSICIAN ASSISTANT

## 2022-08-16 PROCEDURE — 1160F PR REVIEW ALL MEDS BY PRESCRIBER/CLIN PHARMACIST DOCUMENTED: ICD-10-PCS | Mod: CPTII,,, | Performed by: PHYSICIAN ASSISTANT

## 2022-08-16 PROCEDURE — 3044F PR MOST RECENT HEMOGLOBIN A1C LEVEL <7.0%: ICD-10-PCS | Mod: CPTII,,, | Performed by: PHYSICIAN ASSISTANT

## 2022-08-16 PROCEDURE — 3008F PR BODY MASS INDEX (BMI) DOCUMENTED: ICD-10-PCS | Mod: CPTII,,, | Performed by: PHYSICIAN ASSISTANT

## 2022-08-16 PROCEDURE — 3044F HG A1C LEVEL LT 7.0%: CPT | Mod: CPTII,,, | Performed by: PHYSICIAN ASSISTANT

## 2022-08-16 PROCEDURE — 3077F PR MOST RECENT SYSTOLIC BLOOD PRESSURE >= 140 MM HG: ICD-10-PCS | Mod: CPTII,,, | Performed by: PHYSICIAN ASSISTANT

## 2022-08-16 PROCEDURE — 3008F BODY MASS INDEX DOCD: CPT | Mod: CPTII,,, | Performed by: PHYSICIAN ASSISTANT

## 2022-08-16 PROCEDURE — 99213 PR OFFICE/OUTPT VISIT, EST, LEVL III, 20-29 MIN: ICD-10-PCS | Mod: S$PBB,,, | Performed by: PHYSICIAN ASSISTANT

## 2022-08-16 PROCEDURE — 3077F SYST BP >= 140 MM HG: CPT | Mod: CPTII,,, | Performed by: PHYSICIAN ASSISTANT

## 2022-08-16 PROCEDURE — 1159F PR MEDICATION LIST DOCUMENTED IN MEDICAL RECORD: ICD-10-PCS | Mod: CPTII,,, | Performed by: PHYSICIAN ASSISTANT

## 2022-08-16 PROCEDURE — 1160F RVW MEDS BY RX/DR IN RCRD: CPT | Mod: CPTII,,, | Performed by: PHYSICIAN ASSISTANT

## 2022-08-16 PROCEDURE — 3080F PR MOST RECENT DIASTOLIC BLOOD PRESSURE >= 90 MM HG: ICD-10-PCS | Mod: CPTII,,, | Performed by: PHYSICIAN ASSISTANT

## 2022-08-16 PROCEDURE — 99999 PR PBB SHADOW E&M-EST. PATIENT-LVL IV: ICD-10-PCS | Mod: PBBFAC,,, | Performed by: PHYSICIAN ASSISTANT

## 2022-08-16 PROCEDURE — 99213 OFFICE O/P EST LOW 20 MIN: CPT | Mod: S$PBB,,, | Performed by: PHYSICIAN ASSISTANT

## 2022-08-16 PROCEDURE — 1159F MED LIST DOCD IN RCRD: CPT | Mod: CPTII,,, | Performed by: PHYSICIAN ASSISTANT

## 2022-08-16 PROCEDURE — 3080F DIAST BP >= 90 MM HG: CPT | Mod: CPTII,,, | Performed by: PHYSICIAN ASSISTANT

## 2022-08-16 NOTE — PROGRESS NOTES
Subjective:       Patient ID: Karey Palmer is a 47 y.o. female.    Chief Complaint: Elevated Blood Pressure Readings    HPI: 46 yo female presents for HTN. On 8/15/22 her BP was 160/110 at [facility]. In the past she states her pressure has been elevated but improves when she eats low sodium and takes garlic pills. No scheduled exercise but she is active with her current job. Started back taking garlic pills yesterday.  States [facility] and the pressure was 124/?.      Review of Systems   Constitutional: Negative for unexpected weight change.   Eyes: Negative for visual disturbance.   Respiratory: Negative for shortness of breath.    Cardiovascular: Negative for chest pain and leg swelling.   Neurological: Negative for headaches.   Psychiatric/Behavioral: The patient is nervous/anxious (stress).          Objective:      Physical Exam  Constitutional:       Appearance: Normal appearance.   HENT:      Head: Normocephalic and atraumatic.   Cardiovascular:      Rate and Rhythm: Normal rate and regular rhythm.   Pulmonary:      Effort: Pulmonary effort is normal.      Breath sounds: Normal breath sounds.   Neurological:      General: No focal deficit present.      Mental Status: She is alert and oriented to person, place, and time.   Psychiatric:         Mood and Affect: Mood normal.         Behavior: Behavior normal.         Assessment:       Problem List Items Addressed This Visit    None     Visit Diagnoses     Hypertension, essential, benign    -  Primary          Plan:         Karey was seen today for elevated blood pressure readings.    Diagnoses and all orders for this visit:    Hypertension, essential, benign  She will decrease salt intake and take garlic tabs  She will come back Friday, if pressure improved I will approve her to proceed with job eval

## 2022-08-19 ENCOUNTER — TELEPHONE (OUTPATIENT)
Dept: FAMILY MEDICINE | Facility: CLINIC | Age: 47
End: 2022-08-19

## 2022-08-19 ENCOUNTER — CLINICAL SUPPORT (OUTPATIENT)
Dept: FAMILY MEDICINE | Facility: CLINIC | Age: 47
End: 2022-08-19
Payer: MEDICAID

## 2022-08-19 VITALS — SYSTOLIC BLOOD PRESSURE: 142 MMHG | DIASTOLIC BLOOD PRESSURE: 94 MMHG

## 2022-08-19 DIAGNOSIS — I10 HYPERTENSION, ESSENTIAL, BENIGN: Primary | ICD-10-CM

## 2022-08-19 PROCEDURE — 99999 PR PBB SHADOW E&M-EST. PATIENT-LVL I: CPT | Mod: PBBFAC,,,

## 2022-08-19 PROCEDURE — 99211 OFF/OP EST MAY X REQ PHY/QHP: CPT | Mod: PBBFAC,PO

## 2022-08-19 PROCEDURE — 99999 PR PBB SHADOW E&M-EST. PATIENT-LVL I: ICD-10-PCS | Mod: PBBFAC,,,

## 2022-08-19 RX ORDER — HYDROCHLOROTHIAZIDE 12.5 MG/1
12.5 TABLET ORAL DAILY
Qty: 30 TABLET | Refills: 0 | Status: SHIPPED | OUTPATIENT
Start: 2022-08-19 | End: 2022-08-26 | Stop reason: SDUPTHER

## 2022-08-19 NOTE — PROGRESS NOTES
Karey Palmer 47 y.o. female is here today for Blood Pressure check.   History of HTN no.    Review of patient's allergies indicates:  No Known Allergies  Creatinine   Date Value Ref Range Status   03/21/2022 0.8 0.5 - 1.4 mg/dL Final     Sodium   Date Value Ref Range Status   03/21/2022 141 136 - 145 mmol/L Final     Potassium   Date Value Ref Range Status   03/21/2022 3.8 3.5 - 5.1 mmol/L Final   ]  Patient denies taking blood pressure medications on a regular basis at the same time of the day.     Current Outpatient Medications:     BLOOD PRESSURE CUFF Misc, 1 each by Misc.(Non-Drug; Combo Route) route once daily. (Patient not taking: No sig reported), Disp: 1 each, Rfl: 0    fluticasone propionate (FLONASE) 50 mcg/actuation nasal spray, 1 spray by Each Nostril route once daily., Disp: , Rfl:     GARLIC ORAL, Take by mouth., Disp: , Rfl:     ibuprofen (ADVIL,MOTRIN) 600 MG tablet, Take 1 tablet (600 mg total) by mouth every 6 (six) hours as needed for Pain., Disp: 20 tablet, Rfl: 0    LIDOcaine (LIDODERM) 5 %, Place 1 patch onto the skin once daily. Remove & Discard patch within 12 hours or as directed by MD (Patient not taking: No sig reported), Disp: 15 patch, Rfl: 0  Does patient have record of home blood pressure readings no. Readings have been averaging not done.   Last dose of blood pressure medication was taken at not on any medications.  Patient is asymptomatic.   Complains of no complaints.    Vitals:    08/19/22 1017 08/19/22 1048   BP: (!) 146/100 (!) 142/94   BP Location: Right arm Right arm   Patient Position: Sitting Sitting   BP Method: Medium (Manual) Large (Manual)         Dr. Cota informed of nurse visit.

## 2022-08-26 ENCOUNTER — OFFICE VISIT (OUTPATIENT)
Dept: FAMILY MEDICINE | Facility: CLINIC | Age: 47
End: 2022-08-26
Payer: MEDICAID

## 2022-08-26 VITALS
TEMPERATURE: 99 F | DIASTOLIC BLOOD PRESSURE: 90 MMHG | WEIGHT: 154.31 LBS | BODY MASS INDEX: 26.34 KG/M2 | OXYGEN SATURATION: 97 % | RESPIRATION RATE: 16 BRPM | HEART RATE: 84 BPM | HEIGHT: 64 IN | SYSTOLIC BLOOD PRESSURE: 134 MMHG

## 2022-08-26 DIAGNOSIS — I10 HYPERTENSION, ESSENTIAL, BENIGN: Primary | ICD-10-CM

## 2022-08-26 PROCEDURE — 99213 OFFICE O/P EST LOW 20 MIN: CPT | Mod: S$PBB,,, | Performed by: PHYSICIAN ASSISTANT

## 2022-08-26 PROCEDURE — 3080F DIAST BP >= 90 MM HG: CPT | Mod: CPTII,,, | Performed by: PHYSICIAN ASSISTANT

## 2022-08-26 PROCEDURE — 99214 OFFICE O/P EST MOD 30 MIN: CPT | Mod: PBBFAC,PO | Performed by: PHYSICIAN ASSISTANT

## 2022-08-26 PROCEDURE — 1159F MED LIST DOCD IN RCRD: CPT | Mod: CPTII,,, | Performed by: PHYSICIAN ASSISTANT

## 2022-08-26 PROCEDURE — 1160F PR REVIEW ALL MEDS BY PRESCRIBER/CLIN PHARMACIST DOCUMENTED: ICD-10-PCS | Mod: CPTII,,, | Performed by: PHYSICIAN ASSISTANT

## 2022-08-26 PROCEDURE — 3080F PR MOST RECENT DIASTOLIC BLOOD PRESSURE >= 90 MM HG: ICD-10-PCS | Mod: CPTII,,, | Performed by: PHYSICIAN ASSISTANT

## 2022-08-26 PROCEDURE — 1159F PR MEDICATION LIST DOCUMENTED IN MEDICAL RECORD: ICD-10-PCS | Mod: CPTII,,, | Performed by: PHYSICIAN ASSISTANT

## 2022-08-26 PROCEDURE — 3075F SYST BP GE 130 - 139MM HG: CPT | Mod: CPTII,,, | Performed by: PHYSICIAN ASSISTANT

## 2022-08-26 PROCEDURE — 99999 PR PBB SHADOW E&M-EST. PATIENT-LVL IV: CPT | Mod: PBBFAC,,, | Performed by: PHYSICIAN ASSISTANT

## 2022-08-26 PROCEDURE — 3075F PR MOST RECENT SYSTOLIC BLOOD PRESS GE 130-139MM HG: ICD-10-PCS | Mod: CPTII,,, | Performed by: PHYSICIAN ASSISTANT

## 2022-08-26 PROCEDURE — 3044F PR MOST RECENT HEMOGLOBIN A1C LEVEL <7.0%: ICD-10-PCS | Mod: CPTII,,, | Performed by: PHYSICIAN ASSISTANT

## 2022-08-26 PROCEDURE — 3044F HG A1C LEVEL LT 7.0%: CPT | Mod: CPTII,,, | Performed by: PHYSICIAN ASSISTANT

## 2022-08-26 PROCEDURE — 1160F RVW MEDS BY RX/DR IN RCRD: CPT | Mod: CPTII,,, | Performed by: PHYSICIAN ASSISTANT

## 2022-08-26 PROCEDURE — 3008F PR BODY MASS INDEX (BMI) DOCUMENTED: ICD-10-PCS | Mod: CPTII,,, | Performed by: PHYSICIAN ASSISTANT

## 2022-08-26 PROCEDURE — 99213 PR OFFICE/OUTPT VISIT, EST, LEVL III, 20-29 MIN: ICD-10-PCS | Mod: S$PBB,,, | Performed by: PHYSICIAN ASSISTANT

## 2022-08-26 PROCEDURE — 99999 PR PBB SHADOW E&M-EST. PATIENT-LVL IV: ICD-10-PCS | Mod: PBBFAC,,, | Performed by: PHYSICIAN ASSISTANT

## 2022-08-26 PROCEDURE — 3008F BODY MASS INDEX DOCD: CPT | Mod: CPTII,,, | Performed by: PHYSICIAN ASSISTANT

## 2022-08-26 RX ORDER — HYDROCHLOROTHIAZIDE 12.5 MG/1
12.5 TABLET ORAL DAILY
Qty: 30 TABLET | Refills: 1 | Status: SHIPPED | OUTPATIENT
Start: 2022-08-26 | End: 2022-11-16

## 2022-08-26 NOTE — PROGRESS NOTES
Subjective:       Patient ID: Karey Palmer is a 47 y.o. female.    Chief Complaint: Hypertension (1 week follow up)    HPI: 46 yo female presents for HTN follow up. Has been on hctz 12.5 mg for 1 week. Improvement seen in BP. No issues with medication. Trying to eat better and takes garlic pills. No CP, SOB, HA, Blurred vision.     Review of Systems   Respiratory: Negative for shortness of breath.    Cardiovascular: Negative for chest pain.   Neurological: Negative for headaches.         Objective:      Physical Exam  Constitutional:       Appearance: Normal appearance.   HENT:      Head: Normocephalic and atraumatic.   Cardiovascular:      Rate and Rhythm: Normal rate and regular rhythm.   Pulmonary:      Effort: Pulmonary effort is normal.      Breath sounds: Normal breath sounds.   Neurological:      General: No focal deficit present.      Mental Status: She is alert and oriented to person, place, and time.   Psychiatric:         Mood and Affect: Mood normal.         Behavior: Behavior normal.         Assessment:       Problem List Items Addressed This Visit    None     Visit Diagnoses     Hypertension, essential, benign    -  Primary    Relevant Medications    hydroCHLOROthiazide (HYDRODIURIL) 12.5 MG Tab          Plan:         Karey was seen today for hypertension.    Diagnoses and all orders for this visit:    Hypertension, essential, benign  Continue current treatment. She is cleared for work evaluation.   Advised to continue BP meds for 3 months then will reassess

## 2022-10-11 ENCOUNTER — PATIENT OUTREACH (OUTPATIENT)
Dept: ADMINISTRATIVE | Facility: HOSPITAL | Age: 47
End: 2022-10-11
Payer: MEDICAID

## 2022-10-22 DIAGNOSIS — Z12.11 ENCOUNTER FOR SCREENING COLONOSCOPY FOR NON-HIGH-RISK PATIENT: Primary | ICD-10-CM

## 2022-11-11 ENCOUNTER — TELEPHONE (OUTPATIENT)
Dept: FAMILY MEDICINE | Facility: CLINIC | Age: 47
End: 2022-11-11
Payer: MEDICAID

## 2022-11-11 NOTE — TELEPHONE ENCOUNTER
----- Message from Nickie Yip sent at 11/11/2022 10:22 AM CST -----  Type: Patient Call Back    Who called:Self    What is the request in detail: Pt is requesting  call back from the nurse. Pt states that she just found out her mother has cancer and would to have test performed.    Can the clinic reply by [facility]? no    Would the patient rather a call back or a response via [facility]? Call back    Best call back number: [phone] (home)       Additional Information:

## 2022-11-11 NOTE — TELEPHONE ENCOUNTER
Pt states her mother was just diagnosed with pancreas cancer and she wanted to know if there was any tests that she can take to check her pancreas; she was also asking about colonoscopy but orders were placed on 10/22

## 2022-11-14 ENCOUNTER — PATIENT MESSAGE (OUTPATIENT)
Dept: FAMILY MEDICINE | Facility: CLINIC | Age: 47
End: 2022-11-14
Payer: MEDICAID

## 2022-11-15 ENCOUNTER — TELEPHONE (OUTPATIENT)
Dept: ENDOSCOPY | Facility: HOSPITAL | Age: 47
End: 2022-11-15
Payer: MEDICAID

## 2022-11-23 ENCOUNTER — TELEPHONE (OUTPATIENT)
Dept: FAMILY MEDICINE | Facility: CLINIC | Age: 47
End: 2022-11-23

## 2022-11-23 NOTE — TELEPHONE ENCOUNTER
----- Message from Brinda Rubens sent at 11/23/2022  3:41 PM CST -----  Regarding: [phone]  Type: Patient Call Back    Who called:self    What is the request in detail:pt states that she was notifying the office that she did  her her hydroCHLOROthiazide (HYDRODIURIL) 12.5 MG Tab prescription    Can the clinic reply by [facility]?no    Would the patient rather a call back or a response via [facility]? Call back    Best call back number:[phone]

## 2022-12-29 ENCOUNTER — CLINICAL SUPPORT (OUTPATIENT)
Dept: ENDOSCOPY | Facility: HOSPITAL | Age: 47
End: 2022-12-29
Attending: INTERNAL MEDICINE
Payer: MEDICAID

## 2022-12-29 VITALS — BODY MASS INDEX: 27.31 KG/M2 | HEIGHT: 64 IN | WEIGHT: 160 LBS

## 2022-12-29 DIAGNOSIS — Z12.11 SPECIAL SCREENING FOR MALIGNANT NEOPLASMS, COLON: Primary | ICD-10-CM

## 2022-12-29 DIAGNOSIS — Z12.11 ENCOUNTER FOR SCREENING COLONOSCOPY FOR NON-HIGH-RISK PATIENT: ICD-10-CM

## 2022-12-29 RX ORDER — POLYETHYLENE GLYCOL 3350, SODIUM SULFATE ANHYDROUS, SODIUM BICARBONATE, SODIUM CHLORIDE, POTASSIUM CHLORIDE 236; 22.74; 6.74; 5.86; 2.97 G/4L; G/4L; G/4L; G/4L; G/4L
4 POWDER, FOR SOLUTION ORAL ONCE
Qty: 4000 ML | Refills: 0 | Status: SHIPPED | OUTPATIENT
Start: 2022-12-29 | End: 2022-12-29

## 2023-01-13 ENCOUNTER — HOSPITAL ENCOUNTER (OUTPATIENT)
Facility: HOSPITAL | Age: 48
Discharge: HOME | End: 2023-01-13
Attending: INTERNAL MEDICINE | Admitting: INTERNAL MEDICINE
Payer: MEDICAID

## 2023-01-13 ENCOUNTER — ANESTHESIA EVENT (OUTPATIENT)
Dept: ENDOSCOPY | Facility: HOSPITAL | Age: 48
End: 2023-01-13
Payer: MEDICAID

## 2023-01-13 ENCOUNTER — TELEPHONE (OUTPATIENT)
Dept: GASTROENTEROLOGY | Facility: CLINIC | Age: 48
End: 2023-01-13
Payer: MEDICAID

## 2023-01-13 ENCOUNTER — ANESTHESIA (OUTPATIENT)
Dept: ENDOSCOPY | Facility: HOSPITAL | Age: 48
End: 2023-01-13
Payer: MEDICAID

## 2023-01-13 VITALS
OXYGEN SATURATION: 100 % | RESPIRATION RATE: 18 BRPM | TEMPERATURE: 99 F | HEART RATE: 75 BPM | SYSTOLIC BLOOD PRESSURE: 149 MMHG | DIASTOLIC BLOOD PRESSURE: 85 MMHG

## 2023-01-13 DIAGNOSIS — Z12.11 COLON CANCER SCREENING: Primary | ICD-10-CM

## 2023-01-13 LAB
B-HCG UR QL: NEGATIVE
CTP QC/QA: YES

## 2023-01-13 PROCEDURE — D9220A PRA ANESTHESIA: Mod: CRNA,,, | Performed by: NURSE ANESTHETIST, CERTIFIED REGISTERED

## 2023-01-13 PROCEDURE — 00812 ANES LWR INTST SCR COLSC: CPT | Performed by: INTERNAL MEDICINE

## 2023-01-13 PROCEDURE — 25000003 PHARM REV CODE 250: Performed by: NURSE ANESTHETIST, CERTIFIED REGISTERED

## 2023-01-13 PROCEDURE — D9220A PRA ANESTHESIA: ICD-10-PCS | Mod: CRNA,,, | Performed by: NURSE ANESTHETIST, CERTIFIED REGISTERED

## 2023-01-13 PROCEDURE — 37000008 HC ANESTHESIA 1ST 15 MINUTES: Performed by: INTERNAL MEDICINE

## 2023-01-13 PROCEDURE — 81025 URINE PREGNANCY TEST: CPT | Performed by: INTERNAL MEDICINE

## 2023-01-13 PROCEDURE — 45378 PR COLONOSCOPY,DIAGNOSTIC: ICD-10-PCS | Mod: ,,, | Performed by: INTERNAL MEDICINE

## 2023-01-13 PROCEDURE — 63600175 PHARM REV CODE 636 W HCPCS: Performed by: NURSE ANESTHETIST, CERTIFIED REGISTERED

## 2023-01-13 PROCEDURE — D9220A PRA ANESTHESIA: Mod: ANES,,, | Performed by: ANESTHESIOLOGY

## 2023-01-13 PROCEDURE — 37000009 HC ANESTHESIA EA ADD 15 MINS: Performed by: INTERNAL MEDICINE

## 2023-01-13 PROCEDURE — D9220A PRA ANESTHESIA: ICD-10-PCS | Mod: ANES,,, | Performed by: ANESTHESIOLOGY

## 2023-01-13 PROCEDURE — G0121 COLON CA SCRN NOT HI RSK IND: HCPCS | Performed by: INTERNAL MEDICINE

## 2023-01-13 PROCEDURE — 45378 DIAGNOSTIC COLONOSCOPY: CPT | Mod: ,,, | Performed by: INTERNAL MEDICINE

## 2023-01-13 RX ORDER — PROPOFOL 10 MG/ML
INJECTION, EMULSION INTRAVENOUS
Status: COMPLETED
Start: 2023-01-13 | End: 2023-01-13

## 2023-01-13 RX ORDER — PROPOFOL 10 MG/ML
VIAL (ML) INTRAVENOUS
Status: DISCONTINUED | OUTPATIENT
Start: 2023-01-13 | End: 2023-01-13

## 2023-01-13 RX ORDER — LIDOCAINE HYDROCHLORIDE 10 MG/ML
INJECTION, SOLUTION EPIDURAL; INFILTRATION; INTRACAUDAL; PERINEURAL
Status: DISCONTINUED
Start: 2023-01-13 | End: 2023-01-13 | Stop reason: HOSPADM

## 2023-01-13 RX ORDER — LIDOCAINE HYDROCHLORIDE 20 MG/ML
INJECTION, SOLUTION EPIDURAL; INFILTRATION; INTRACAUDAL; PERINEURAL
Status: DISCONTINUED
Start: 2023-01-13 | End: 2023-01-13 | Stop reason: HOSPADM

## 2023-01-13 RX ORDER — SODIUM CHLORIDE 9 MG/ML
INJECTION, SOLUTION INTRAVENOUS CONTINUOUS
Status: DISCONTINUED | OUTPATIENT
Start: 2023-01-13 | End: 2023-01-13 | Stop reason: HOSPADM

## 2023-01-13 RX ORDER — LIDOCAINE HYDROCHLORIDE 20 MG/ML
INJECTION INTRAVENOUS
Status: DISCONTINUED | OUTPATIENT
Start: 2023-01-13 | End: 2023-01-13

## 2023-01-13 RX ORDER — PROPOFOL 10 MG/ML
INJECTION, EMULSION INTRAVENOUS
Status: DISCONTINUED
Start: 2023-01-13 | End: 2023-01-13 | Stop reason: HOSPADM

## 2023-01-13 RX ORDER — FLUTICASONE PROPIONATE 50 MCG
1 SPRAY, SUSPENSION (ML) NASAL DAILY
Qty: 16 G | Refills: 0 | Status: SHIPPED | OUTPATIENT
Start: 2023-01-13 | End: 2023-03-03

## 2023-01-13 RX ADMIN — PROPOFOL 50 MG: 10 INJECTION, EMULSION INTRAVENOUS at 12:01

## 2023-01-13 RX ADMIN — LIDOCAINE HYDROCHLORIDE 50 MG: 20 INJECTION, SOLUTION INTRAVENOUS at 12:01

## 2023-01-13 RX ADMIN — SODIUM CHLORIDE: 0.9 INJECTION, SOLUTION INTRAVENOUS at 12:01

## 2023-01-13 RX ADMIN — PROPOFOL 100 MG: 10 INJECTION, EMULSION INTRAVENOUS at 12:01

## 2023-01-13 NOTE — TRANSFER OF CARE
Anesthesia Transfer of Care Note    Patient: Karey Palmer    Procedure(s) Performed: Procedure(s) (LRB):  COLONOSCOPY (N/A)    Patient location: GI    Anesthesia Type: MAC    Transport from OR: Transported from OR on room air with adequate spontaneous ventilation    Post pain: adequate analgesia    Post assessment: tolerated procedure well    Post vital signs: stable    Level of consciousness: awake, alert and oriented    Nausea/Vomiting: no nausea/vomiting    Complications: none    Transfer of care protocol was followed      Last vitals:   Visit Vitals  /83 (BP Location: Left arm, Patient Position: Lying)   Pulse 96   Temp 37 °C (98.6 °F) (Oral)   Resp 18   SpO2 100%   Breastfeeding No

## 2023-01-13 NOTE — ANESTHESIA POSTPROCEDURE EVALUATION
Anesthesia Post Evaluation    Patient: Karey Palmer    Procedure(s) Performed: Procedure(s) (LRB):  COLONOSCOPY (N/A)    Final Anesthesia Type: general      Patient location during evaluation: GI PACU  Patient participation: Yes- Able to Participate  Level of consciousness: awake and alert  Post-procedure vital signs: reviewed and stable  Pain management: adequate  Airway patency: patent    PONV status at discharge: No PONV  Anesthetic complications: no      Cardiovascular status: hemodynamically stable  Respiratory status: unassisted and spontaneous ventilation  Hydration status: euvolemic  Follow-up not needed.          Vitals Value Taken Time   /88 01/13/23 1248   Temp 37 °C (98.6 °F) 01/13/23 1228   Pulse 73 01/13/23 1248   Resp 18 01/13/23 1248   SpO2 100 % 01/13/23 1248         No case tracking events are documented in the log.      Pain/Julieta Score: No data recorded

## 2023-01-13 NOTE — ANESTHESIA PREPROCEDURE EVALUATION
01/13/2023  Karey Palmer is a 47 y.o., female.      Pre-op Assessment    I have reviewed the Patient Summary Reports.     I have reviewed the Nursing Notes.       Review of Systems  Anesthesia Hx:  No previous Anesthesia  Denies Family Hx of Anesthesia complications.    Social:  Non-Smoker, No Alcohol Use    Cardiovascular:   Exercise tolerance: good Hypertension Denies Dysrhythmias.   Denies CHF.    Pulmonary:  Pulmonary Normal    Renal/:  Renal/ Normal     Hepatic/GI:  Hepatic/GI Normal    Neurological:  Neurology Normal    Endocrine:  Endocrine Normal        Physical Exam  General: Well nourished, Cooperative, Alert and Oriented    Airway:  Mallampati: II   Mouth Opening: Normal  TM Distance: 4 - 6 cm  Tongue: Normal  Neck ROM: Normal ROM    Dental:  2 gold caps on top  Chest/Lungs:  Clear to auscultation, Normal Respiratory Rate    Heart:  Rate: Normal  Rhythm: Regular Rhythm      Wt Readings from Last 3 Encounters:   12/29/22 72.6 kg (160 lb)   08/26/22 70 kg (154 lb 5.2 oz)   08/16/22 71.6 kg (157 lb 13.6 oz)     Temp Readings from Last 3 Encounters:   08/26/22 37.4 °C (99.3 °F) (Oral)   08/16/22 37.3 °C (99.2 °F) (Oral)   03/21/22 36.6 °C (97.8 °F) (Oral)     BP Readings from Last 3 Encounters:   08/26/22 (!) 134/90   08/19/22 (!) 142/94   08/16/22 (!) 157/105     Pulse Readings from Last 3 Encounters:   08/26/22 84   08/16/22 80   03/21/22 88     1/13/2023 UPT negative    Anesthesia Plan  Type of Anesthesia, risks & benefits discussed:    Anesthesia Type: Gen Natural Airway, MAC  Intra-op Monitoring Plan: Standard ASA Monitors  Post Op Pain Control Plan: multimodal analgesia  Induction:  IV  Informed Consent: Informed consent signed with the Patient and all parties understand the risks and agree with anesthesia plan.  All questions answered.   ASA Score: 2  Day of Surgery Review of History &  Physical: H&P Update referred to the surgeon/provider.    Ready For Surgery From Anesthesia Perspective.     .

## 2023-01-13 NOTE — TELEPHONE ENCOUNTER
----- Message from Azra Mendez sent at 1/13/2023  3:48 PM CST -----  .Type: RX Refill Request    Who Called: Self    Have you contacted your pharmacy:Yes    Refill or New Rx:Refill    RX Name and Strength:fluticasone propionate (FLONASE) 50 mcg/actuation nasal spray    Preferred Pharmacy with phone number:.  [id number] - [place] [id number]  [address] [place] [address] [address]  [facility] [address]  Phone: [phone] Fax: [phone]        Local or Mail Order:Local    Ordering Provider:Melanie    Would the patient rather a call back or a response via [facility]? Call    Best Call Back Number: .[phone] (home)       Additional Information:

## 2023-01-13 NOTE — H&P
Short Stay Endoscopy History and Physical    PCP - Margarette Navarro MD    Procedure - Colonoscopy  ASA - per anesthesia  Mallampati - per anesthesia  History of Anesthesia problems - no  Family history Anesthesia problems - no   Plan of anesthesia - General    HPI:  This is a 47 y.o. female here for evaluation of : asymptomatic screening exam      ROS:  Constitutional: No fevers, chills, No weight loss  CV: No chest pain  Pulm: No cough, No shortness of breath  GI: see HPI  Derm: No rash    Medical History:  has no past medical history on file.    Surgical History:  has no past surgical history on file.    Family History: family history includes Breast cancer in her cousin, maternal aunt, paternal grandmother, and another family member.. Otherwise no colon cancer, inflammatory bowel disease, or GI malignancies.    Social History:  reports that she has never smoked. She has never used smokeless tobacco. She reports that she does not drink alcohol and does not use drugs.    Review of patient's allergies indicates:  No Known Allergies    Medications:   Medications Prior to Admission   Medication Sig Dispense Refill Last Dose    BLOOD PRESSURE CUFF Misc 1 each by Misc.(Non-Drug; Combo Route) route once daily. (Patient not taking: No sig reported) 1 each 0     fluticasone propionate (FLONASE) 50 mcg/actuation nasal spray 1 spray by Each Nostril route once daily.   Unknown    GARLIC ORAL Take by mouth.   Unknown    hydroCHLOROthiazide (HYDRODIURIL) 12.5 MG Tab TAKE 1 TABLET(12.5 MG) BY MOUTH EVERY DAY 30 tablet 2 Unknown    ibuprofen (ADVIL,MOTRIN) 600 MG tablet Take 1 tablet (600 mg total) by mouth every 6 (six) hours as needed for Pain. (Patient not taking: Reported on 8/26/2022) 20 tablet 0 Unknown    LIDOcaine (LIDODERM) 5 % Place 1 patch onto the skin once daily. Remove & Discard patch within 12 hours or as directed by MD (Patient not taking: Reported on 3/21/2022) 15 patch 0 Unknown         Physical  Exam:    Vital Signs: There were no vitals filed for this visit.    Gen: NAD, lying comfortably  HENT: NCAT, oropharynx clear  Eyes: anicteric sclerae, EOMI grossly  Neck: supple, no visible masses/goiter  Cardiac: RRR  Lungs: non-labored breathing  Abd: soft, NT/ND, normoactive BS  Ext: no LE edema, warm, well perfused  Skin: skin intact on exposed body parts, no visible rashes, lesions  Neuro: A&Ox4, neuro exam grossly intact, moves all extremities  Psych: appropriate mood, affect        Labs:  Lab Results   Component Value Date    WBC 4.99 03/21/2022    HGB 12.8 03/21/2022    HCT 40.8 03/21/2022     03/21/2022    CHOL 196 03/21/2022    TRIG 46 03/21/2022    HDL 75 03/21/2022    ALT 16 03/21/2022    AST 19 03/21/2022     03/21/2022    K 3.8 03/21/2022     03/21/2022    CREATININE 0.8 03/21/2022    BUN 7 03/21/2022    CO2 24 03/21/2022    TSH 0.857 03/21/2022    HGBA1C 4.9 03/21/2022       Plan:  Colonoscopy for colon cancer screening.    I have explained the risks and benefits of endoscopy procedures to the patient including but not limited to bleeding, perforation, infection, and death.  The patient was asked if they understand and allowed to ask any further questions to their satisfaction.    Suzanne Murray MD

## 2023-01-13 NOTE — PLAN OF CARE
Patient alert, oriented, and steady on feet.  Patient denies any pain or discomfort at this time.  Patient has discharge instructions and verbalizes understanding. Patient has all of her belongings. Patient will discharge home with mother.

## 2023-01-13 NOTE — OR NURSING
Dr. Murray at bedside speaking with patient and patient's mother about procedure findings and recommendations.

## 2023-01-13 NOTE — PROVATION PATIENT INSTRUCTIONS
Discharge Summary/Instructions after an Endoscopic Procedure  Patient Name: Karey Palmer  Patient MRN: [id number]  Patient YOB: 1975 Friday, January 13, 2023  Suzanne Murray MD  Dear patient,  As a result of recent federal legislation (The Federal Cures Act), you may   receive lab or pathology results from your procedure in your [facility]   account before your physician is able to contact you. Your physician or   their representative will relay the results to you with their   recommendations at their soonest availability.  Thank you,  RESTRICTIONS:  During your procedure today, you received medications for sedation.  These   medications may affect your judgment, balance and coordination.  Therefore,   for 24 hours, you have the following restrictions:   - DO NOT drive a car, operate machinery, make legal/financial decisions,   sign important papers or drink alcohol.    ACTIVITY:  Today: no heavy lifting, straining or running due to procedural   sedation/anesthesia.  The following day: return to full activity including work.  DIET:  Eat and drink normally unless instructed otherwise.     TREATMENT FOR COMMON SIDE EFFECTS:  - Mild abdominal pain, nausea, belching, bloating or excessive gas:  rest,   eat lightly and use a heating pad.  - Sore Throat: treat with throat lozenges and/or gargle with warm salt   water.  - Because air was used during the procedure, expelling large amounts of air   from your rectum or belching is normal.  - If a bowel prep was taken, you may not have a bowel movement for 1-3 days.    This is normal.  SYMPTOMS TO WATCH FOR AND REPORT TO YOUR PHYSICIAN:  1. Abdominal pain or bloating, other than gas cramps.  2. Chest pain.  3. Back pain.  4. Signs of infection such as: chills or fever occurring within 24 hours   after the procedure.  5. Rectal bleeding, which would show as bright red, maroon, or black stools.   (A tablespoon of blood from the rectum is not serious, especially  if   hemorrhoids are present.)  6. Vomiting.  7. Weakness or dizziness.  GO DIRECTLY TO THE NEAREST EMERGENCY ROOM IF YOU HAVE ANY OF THE FOLLOWING:      Difficulty breathing              Chills and/or fever over 101 F   Persistent vomiting and/or vomiting blood   Severe abdominal pain   Severe chest pain   Black, tarry stools   Bleeding- more than one tablespoon   Any other symptom or condition that you feel may need urgent attention  Your doctor recommends these additional instructions:  If any biopsies were taken, your doctors clinic will contact you in 1 to 2   weeks with any results.  - Discharge patient to home.   - Resume previous diet.   - Continue present medications.   - Repeat colonoscopy at appointment to be scheduled because the bowel   preparation was poor.  For questions, problems or results please call your physician - Suzanne Murray MD at Work:  [phone].  [facility] can be reached at [phone]     IF A COMPLICATION OR EMERGENCY SITUATION ARISES AND YOU ARE UNABLE TO REACH   YOUR PHYSICIAN - GO DIRECTLY TO THE EMERGENCY ROOM.  Suzanne Murray MD  1/13/2023 12:28:33 PM  This report has been verified and signed electronically.  Dear patient,  As a result of recent federal legislation (The Federal Cures Act), you may   receive lab or pathology results from your procedure in your [facility]   account before your physician is able to contact you. Your physician or   their representative will relay the results to you with their   recommendations at their soonest availability.  Thank you,  PROVATION

## 2023-01-13 NOTE — TELEPHONE ENCOUNTER
No new care gaps identified.  [facility] Embedded Care Gaps. Reference number: [id number]. 1/13/2023   3:56:14 PM CST

## 2023-01-13 NOTE — TELEPHONE ENCOUNTER
----- Message from Suzanne Murray MD sent at 1/13/2023  1:38 PM CST -----  Regarding: Schedule appt with  to reschedule colonoscopy  Can you please reach out to this patient to reschedule her to speak with a  to reschedule her colonoscopy?  Thanks!    Suzanne

## 2023-03-23 ENCOUNTER — PATIENT MESSAGE (OUTPATIENT)
Dept: ADMINISTRATIVE | Facility: HOSPITAL | Age: 48
End: 2023-03-23
Payer: MEDICAID

## 2023-03-23 ENCOUNTER — CLINICAL SUPPORT (OUTPATIENT)
Dept: ENDOSCOPY | Facility: HOSPITAL | Age: 48
End: 2023-03-23
Attending: INTERNAL MEDICINE
Payer: MEDICAID

## 2023-03-23 VITALS — WEIGHT: 160 LBS | BODY MASS INDEX: 27.31 KG/M2 | HEIGHT: 64 IN

## 2023-03-23 DIAGNOSIS — Z12.11 COLON CANCER SCREENING: ICD-10-CM

## 2023-03-29 DIAGNOSIS — I10 HYPERTENSION, ESSENTIAL, BENIGN: ICD-10-CM

## 2023-04-19 DIAGNOSIS — Z12.31 OTHER SCREENING MAMMOGRAM: ICD-10-CM

## 2023-04-21 ENCOUNTER — PATIENT MESSAGE (OUTPATIENT)
Dept: ENDOSCOPY | Facility: HOSPITAL | Age: 48
End: 2023-04-21
Payer: MEDICAID

## 2023-04-25 ENCOUNTER — PATIENT MESSAGE (OUTPATIENT)
Dept: ADMINISTRATIVE | Facility: HOSPITAL | Age: 48
End: 2023-04-25
Payer: MEDICAID

## 2023-05-04 ENCOUNTER — TELEPHONE (OUTPATIENT)
Dept: GASTROENTEROLOGY | Facility: CLINIC | Age: 48
End: 2023-05-04
Payer: MEDICAID

## 2023-05-04 ENCOUNTER — PATIENT MESSAGE (OUTPATIENT)
Dept: ENDOSCOPY | Facility: HOSPITAL | Age: 48
End: 2023-05-04
Payer: MEDICAID

## 2023-05-04 ENCOUNTER — PATIENT MESSAGE (OUTPATIENT)
Dept: GASTROENTEROLOGY | Facility: CLINIC | Age: 48
End: 2023-05-04
Payer: MEDICAID

## 2023-05-04 NOTE — LETTER
May 4, 2023      [facility]  [address]  [place] [address]  Phone: [phone]  Fax: [phone]       Patient: Karey Palmer   YOB: 1975  Date of Visit: 05/04/2023    To Whom It May Concern:    Please excuse Karey Palmer from work on the dates of May 6 and May 7.  If you have any questions or concerns, or if I can be of further assistance, please do not hesitate to contact me.    Sincerely,    Suzanne Murray MD

## 2023-05-04 NOTE — TELEPHONE ENCOUNTER
----- Message from Aguila Sweeney sent at 5/4/2023  8:28 AM CDT -----  Contact: pt  Pt requesting call back RE: would like a note to excuse her from work during prep for Saturday and Sunday, she is a  and her job is giving her push back in regards to being off those days she would like it uploaded into her portal and emailed to her at [email or web address]    Confirmed contact below:  Contact Name:Karey Spencer  Phone Number: [phone]

## 2023-05-04 NOTE — TELEPHONE ENCOUNTER
----- Message from Suzanne Murray MD sent at 5/4/2023  8:59 AM CDT -----  Contact: pt  That's fine, can you send a note excusing her from work during those days due to prepping?  ----- Message -----  From: Karli Church MA  Sent: 5/4/2023   8:35 AM CDT  To: Suzanne Murray MD      ----- Message -----  From: Aguila Sweeney  Sent: 5/4/2023   8:34 AM CDT  To: Trevor Palacios Staff    Pt requesting call back RE: would like a note to excuse her from work during prep for Saturday and Sunday, she is a  and her job is giving her push back in regards to being off those days she would like it uploaded into her portal and emailed to her at [email or web address]    Confirmed contact below:  Contact Name:Karey Palmer  Phone Number: [phone]

## 2023-05-05 ENCOUNTER — TELEPHONE (OUTPATIENT)
Dept: FAMILY MEDICINE | Facility: CLINIC | Age: 48
End: 2023-05-05
Payer: MEDICAID

## 2023-05-05 ENCOUNTER — TELEPHONE (OUTPATIENT)
Dept: ENDOSCOPY | Facility: HOSPITAL | Age: 48
End: 2023-05-05
Payer: MEDICAID

## 2023-05-05 NOTE — TELEPHONE ENCOUNTER
----- Message from Kiana Velasco sent at 5/5/2023  8:50 AM CDT -----  .Type: Patient Call Back    Who called:self    What is the request in detail: patient states she needs a letter for her job stating she'll be out for colonoscopy , she states they are giving her a hard time about upcoming procedure and requesting a callback to discuss further    Can the clinic reply by [facility]? call    Would the patient rather a call back or a response via [facility]? call    Best call back number:.[phone]     Additional Information:

## 2023-05-05 NOTE — LETTER
May 5, 2023      [facility]  [address] [place] [address]  Phone: [phone]  Fax: [phone]       Patient: Karey Palmer   YOB: 1975  Date of Visit: 05/05/2023    To Whom It May Concern:    Juan Palmer  was at [facility] on 05/05/2023.  Due to patient having testing 5/29/2023 Patient would need to be off 5/27 and 5/28/23 for prepping before procedure. If you have any questions please contact this office.     Dr. Navarro

## 2023-05-05 NOTE — TELEPHONE ENCOUNTER
----- Message from Demetrius Barros sent at 5/5/2023  8:56 AM CDT -----  Regarding: Karey  Type: Patient Callback     Who called: Karey     What is the request in detail: Pt stated the job needs a letter stating that she can not come to work because he has to start her prep and have the procedure done. The dates are 05/27, 05/28 for cleansing and the procedure on 05/29    Can the clinic reply by [facility]? No     Would the patient rather a call back or a response via [facility]? Callback     Best call back number: [phone]      Additional Information: Pt would like that letter as soon as possible. Pt states that she is free for a phone call between 8am - 10am and off on Mondays and Tuesday

## 2023-06-08 ENCOUNTER — TELEPHONE (OUTPATIENT)
Dept: ENDOSCOPY | Facility: HOSPITAL | Age: 48
End: 2023-06-08
Payer: MEDICAID

## 2023-06-12 ENCOUNTER — PATIENT MESSAGE (OUTPATIENT)
Dept: ADMINISTRATIVE | Facility: HOSPITAL | Age: 48
End: 2023-06-12
Payer: MEDICAID

## 2023-08-14 ENCOUNTER — TELEPHONE (OUTPATIENT)
Dept: FAMILY MEDICINE | Facility: CLINIC | Age: 48
End: 2023-08-14
Payer: COMMERCIAL

## 2023-08-14 DIAGNOSIS — Z12.11 ENCOUNTER FOR COLORECTAL CANCER SCREENING: Primary | ICD-10-CM

## 2023-08-14 DIAGNOSIS — Z12.12 ENCOUNTER FOR COLORECTAL CANCER SCREENING: Primary | ICD-10-CM

## 2023-08-14 NOTE — TELEPHONE ENCOUNTER
----- Message from Bridna Art sent at 8/14/2023  8:18 AM CDT -----  Regarding: [id number]  Type: Patient Call Back    Who called:self     What is the request in detail: pt states she will like a call back from the nurse in regards to her colonoscopy, pt states she tried to get her colonoscopy twice but her body wont allow her to go two days without eating. Pt states she will like to discuss with the doctor about other possible options because she really needs to get her coloscopy done.     Can the clinic reply by [facility]?no     Would the patient rather a call back or a response via [facility]? Call back     Best call back number:[phone]    Additional Information:

## 2023-08-14 NOTE — TELEPHONE ENCOUNTER
Pt would like to do stool test since she is not able to tolerate not eating to prep for colonoscopy; orders pended for cologuard

## 2023-08-24 LAB — NONINV COLON CA DNA+OCC BLD SCRN STL QL: NEGATIVE

## 2023-09-01 ENCOUNTER — HOSPITAL ENCOUNTER (EMERGENCY)
Facility: HOSPITAL | Age: 48
Discharge: HOME | End: 2023-09-01
Attending: EMERGENCY MEDICINE
Payer: MEDICAID

## 2023-09-01 VITALS
WEIGHT: 160 LBS | OXYGEN SATURATION: 100 % | DIASTOLIC BLOOD PRESSURE: 93 MMHG | RESPIRATION RATE: 18 BRPM | BODY MASS INDEX: 27.46 KG/M2 | HEART RATE: 73 BPM | SYSTOLIC BLOOD PRESSURE: 157 MMHG | TEMPERATURE: 99 F

## 2023-09-01 DIAGNOSIS — M54.30 SCIATICA, UNSPECIFIED LATERALITY: Primary | ICD-10-CM

## 2023-09-01 LAB
B-HCG UR QL: NEGATIVE
BILIRUB UR QL STRIP: NEGATIVE
CLARITY UR: CLEAR
COLOR UR: YELLOW
CTP QC/QA: YES
GLUCOSE UR QL STRIP: NEGATIVE
HGB UR QL STRIP: NEGATIVE
KETONES UR QL STRIP: ABNORMAL
LEUKOCYTE ESTERASE UR QL STRIP: NEGATIVE
NITRITE UR QL STRIP: NEGATIVE
PH UR STRIP: 6 [PH] (ref 5–8)
PROT UR QL STRIP: NEGATIVE
SP GR UR STRIP: 1.02 (ref 1–1.03)
URN SPEC COLLECT METH UR: ABNORMAL
UROBILINOGEN UR STRIP-ACNC: NEGATIVE EU/DL

## 2023-09-01 PROCEDURE — 87591 N.GONORRHOEAE DNA AMP PROB: CPT | Performed by: EMERGENCY MEDICINE

## 2023-09-01 PROCEDURE — 25000003 PHARM REV CODE 250

## 2023-09-01 PROCEDURE — 81003 URINALYSIS AUTO W/O SCOPE: CPT | Performed by: EMERGENCY MEDICINE

## 2023-09-01 PROCEDURE — 81025 URINE PREGNANCY TEST: CPT | Performed by: EMERGENCY MEDICINE

## 2023-09-01 PROCEDURE — 99284 EMERGENCY DEPT VISIT MOD MDM: CPT

## 2023-09-01 RX ORDER — IBUPROFEN 600 MG/1
600 TABLET ORAL
Status: COMPLETED | OUTPATIENT
Start: 2023-09-01 | End: 2023-09-01

## 2023-09-01 RX ADMIN — IBUPROFEN 600 MG: 600 TABLET ORAL at 04:09

## 2023-09-01 NOTE — FIRST PROVIDER EVALUATION
Medical screening examination initiated.  I have conducted a focused provider triage encounter, findings are as follows:    Brief history of present illness:  48-year-old with suprapubic pain and pain in her back.    Vitals:    09/01/23 1532   BP: (!) 158/73   BP Location: Right arm   Patient Position: Sitting   Pulse: 80   Resp: 18   Temp: 98.2 °F (36.8 °C)   SpO2: 100%   Weight: 72.6 kg (160 lb)       Pertinent physical exam:  Regular rate and rhythm.  Unlabored respirations.  Walking with a steady gait    Brief workup plan:  UA, UPT and GC chlamydia probe    Preliminary workup initiated; this workup will be continued and followed by the physician or advanced practice provider that is assigned to the patient when roomed.

## 2023-09-01 NOTE — DISCHARGE INSTRUCTIONS

## 2023-09-01 NOTE — ED PROVIDER NOTES
Encounter Date: 9/1/2023       History     Chief Complaint   Patient presents with    Abdominal Pain     Pt reports lower abdominal/pelvic pain that started after and IUD was placed in March.  Pain has been intermittent and has gotten worse today.  Pt took ibuprofen for pain last night.      HPI    48-year-old female with a past medical history of hypertension to the emergency department complaining of worsening lower abdominal/pelvic pain since March.  Patient states she had a copper IUD implanted in March of this year and since then has been having lower abdominal discomfort, dyspareunia.  Patient states she just came from a [facility] Emergency Department where she had imaging completed, but left AMA due to a time. US resulted at [facility] today noting no acute abnormalities. Pt denies any fever, CP, SOB, N/V/D, abdominal pain, dysuria, flank pain, hematuria.     Review of patient's allergies indicates:  No Known Allergies  History reviewed. No pertinent past medical history.  Past Surgical History:   Procedure Laterality Date    COLONOSCOPY N/A 1/13/2023    Procedure: COLONOSCOPY;  Surgeon: Suzanne Murray MD;  Location: [facility];  Service: Endoscopy;  Laterality: N/A;  12/29 instructions emailed to [email or web address]     Family History   Problem Relation Age of Onset    Breast cancer Maternal Aunt     Breast cancer Paternal Grandmother     Breast cancer Other     Breast cancer Cousin      Social History     Tobacco Use    Smoking status: Never    Smokeless tobacco: Never   Substance Use Topics    Alcohol use: No    Drug use: No     Review of Systems   Constitutional:  Negative for chills and fever.   HENT:  Negative for congestion and sore throat.    Eyes:  Negative for visual disturbance.   Respiratory:  Negative for shortness of breath.    Cardiovascular:  Negative for chest pain.   Gastrointestinal:  Negative for abdominal pain and nausea.   Genitourinary:  Positive for dyspareunia. Negative  for decreased urine volume, difficulty urinating, dysuria, flank pain, frequency, hematuria, menstrual problem, pelvic pain, urgency, vaginal bleeding, vaginal discharge and vaginal pain.   Musculoskeletal:  Positive for back pain (chronic low back). Negative for arthralgias, gait problem, myalgias and neck stiffness.   Skin:  Negative for rash.   Neurological:  Negative for weakness and headaches.   Hematological:  Does not bruise/bleed easily.   Psychiatric/Behavioral:  Negative for confusion.        Physical Exam     Initial Vitals [09/01/23 1532]   BP Pulse Resp Temp SpO2   (!) 158/73 80 18 98.2 °F (36.8 °C) 100 %      MAP       --         Physical Exam    Nursing note and vitals reviewed.  Constitutional: She appears well-developed and well-nourished. She is not diaphoretic. No distress.   HENT:   Head: Normocephalic and atraumatic.   Right Ear: External ear normal.   Left Ear: External ear normal.   Nose: Nose normal.   Mouth/Throat: Uvula is midline, oropharynx is clear and moist and mucous membranes are normal.   Eyes: Conjunctivae and EOM are normal. Pupils are equal, round, and reactive to light. Right eye exhibits no discharge. Left eye exhibits no discharge.   Neck:   Normal range of motion.  Cardiovascular:  Normal rate, regular rhythm, normal heart sounds, intact distal pulses and normal pulses.           Pulmonary/Chest: Effort normal and breath sounds normal. No respiratory distress.   Abdominal: Abdomen is soft. Bowel sounds are normal. She exhibits no distension. There is no abdominal tenderness.   No right CVA tenderness.  No left CVA tenderness.   Musculoskeletal:         General: Normal range of motion.      Right shoulder: Normal.      Left shoulder: Normal.      Cervical back: Normal and normal range of motion.      Thoracic back: Normal.      Lumbar back: Normal.      Right hip: Normal.      Left hip: Normal.      Right lower leg: Normal.      Left lower leg: Normal.     Neurological: She is  alert and oriented to person, place, and time. She has normal strength. No cranial nerve deficit or sensory deficit.   Skin: Skin is warm and dry. Capillary refill takes less than 2 seconds.   Psychiatric: She has a normal mood and affect. Her speech is normal and behavior is normal. Thought content normal.         ED Course   Procedures  Labs Reviewed   URINALYSIS, REFLEX TO URINE CULTURE - Abnormal; Notable for the following components:       Result Value    Ketones, UA Trace (*)     All other components within normal limits    Narrative:     Specimen Source->Urine   C. TRACHOMATIS/N. GONORRHOEAE BY AMP DNA   POCT URINE PREGNANCY          Imaging Results    None          Medications   ibuprofen tablet 600 mg (600 mg Oral Given 9/1/23 5753)     Medical Decision Making  48-year-old female with a past medical history of hypertension to the emergency department complaining of worsening lower abdominal/pelvic pain since March.  Patient states she had a copper IUD implanted in March of this year and since then has been having lower abdominal discomfort, dyspareunia.  Patient states she just came from a [facility] Emergency Department where she had imaging completed, but left AMA due to a time. US resulted at [facility] today noting no acute abnormalities. Pt denies any fever, CP, SOB, N/V/D, abdominal pain, dysuria, flank pain, hematuria.   Patient's chart and medical history reviewed.  Patient's vitals reviewed.  He is afebrile, no respiratory distress, nontoxic-appearing in the ED.  - ectopic: unlikely with neg pregnancy test  - UTI: unlikley with normal UA  - Dissection/AAA: unlikely with stable v/s, equal pulses bilaterally UE/LE, no abdominal mass/pulsing  - ovarian torsion: unlikely with no evidence on US done today at [facility]  - ovarian cyst: unlikely with no evidence on US done today at [facility]  - IUD complication: unlikely with no evidence on US done today at [facility]   - PID: unlikely with no vaginal discharge,  pain, risky behavior, and having a single long term partner.  The US that was completed at [facility] today prior to patient leaving [facility] earlier AMA because she couldn't wait any longer for the results resulted with the uterus measures 7.3 x 4.1 x 4.0 cm. There is an IUD in place within the endometrium. There is trace endometrial fluid.   No myometrial masses are noted.   The cervix is within normal limits.   The right ovary measures 2.2 x 4.4 x 1.5 cm. Blood flow is intact.   No solid masses are noted.   The left ovary measures 5.0 x 2.5 x 2.3 cm. There are physiologic changes present. Blood flow is intact.   No solid masses are noted.   With normal US findings and no evidence of UTI on UA, stable v/s, patient is ready to be d/c with f/u with her PCP. Pt has already scheduled an appointment next Friday with her OB/GYN to have her IUD removed that she scheduled while waiting for results in [facility].   I discussed with the patient/family the diagnosis, treatment plan, indications for return to the emergency department, and for expected follow-up. The patient/family verbalized an understanding. The patient/family is asked if there are any questions or concerns. We discuss the case, until all issues are addressed to the patient/family's satisfaction. Patient/family understands and is agreeable to the plan.   SHAQ JOAQUIN    DISCLAIMER: This note was prepared with [employer] voice recognition transcription software. Garbled syntax, mangled pronouns, and other bizarre constructions may be attributed to that software system.      Amount and/or Complexity of Data Reviewed  External Data Reviewed: labs, radiology and notes.  Labs: ordered. Decision-making details documented in ED Course.    Risk  Prescription drug management.                               Clinical Impression:   Final diagnoses:  [M54.30] Sciatica, unspecified laterality (Primary)        ED Disposition Condition    Discharge Stable          ED Prescriptions    None        Follow-up Information       Follow up With Specialties Details Why Contact Info    Margarette Navarro MD Family Medicine, Internal Medicine Call in 1 week  [address] [place] [address]  [phone]      your OB/GYN  Schedule an appointment as soon as possible for a visit in 1 week               Miguel Padilla PA-C  09/01/23 1951

## 2023-09-01 NOTE — Clinical Note
"Karey "Jez Palmer was seen and treated in our emergency department on 9/1/2023.  She may return to work on 09/02/2023.  This patient was seen and cared for today in the emergency department and can be excused from work today (09/01/2023). She can return to work tomorrow.     If you have any questions or concerns, please don't hesitate to call.      Miguel Padilla PAShanaC"

## 2023-09-03 LAB
C TRACH DNA SPEC QL NAA+PROBE: NOT DETECTED
N GONORRHOEA DNA SPEC QL NAA+PROBE: NOT DETECTED

## 2023-09-05 ENCOUNTER — TELEPHONE (OUTPATIENT)
Dept: FAMILY MEDICINE | Facility: CLINIC | Age: 48
End: 2023-09-05
Payer: COMMERCIAL

## 2023-09-05 NOTE — TELEPHONE ENCOUNTER
----- Message from Demetrius Barros sent at 9/5/2023  9:54 AM CDT -----  Regarding: Karey  Type:  Sooner Appointment Request     Patient is requesting a sooner appointment.  Patient declined first available appointment listed as well as another facility and provider .  Patient will not accept being placed on the waitlist and is requesting a message be sent to doctor.     Name of Caller: Karey     When is the first available appointment? 12/06    Symptoms: Medication is making her lose her voice     Would the patient rather a call back or a response via [facility]? Callback     Best Call Back Number: [phone]      Additional Information: This one of the the side effect of the the pressure medication. Please call the patient as soon as possible.

## 2023-09-22 ENCOUNTER — PATIENT MESSAGE (OUTPATIENT)
Dept: ADMINISTRATIVE | Facility: HOSPITAL | Age: 48
End: 2023-09-22
Payer: COMMERCIAL

## 2023-09-22 ENCOUNTER — PATIENT OUTREACH (OUTPATIENT)
Dept: ADMINISTRATIVE | Facility: HOSPITAL | Age: 48
End: 2023-09-22
Payer: COMMERCIAL

## 2023-09-22 NOTE — PROGRESS NOTES
Population Health Chart Review & Patient Outreach Details:     Reason for Outreach Encounter:     [x]  Non-Compliant Report   []  Payor Report (Hum[employer], PHN, BCBS, MSSP, MCIP, UHC, etc.)   []  Pre-Visit Chart Review     Updates Requested / Reviewed:     [x]  Care Everywhere    []     []  External Sources ([facility], [facility], DIS, etc.)   [x]  Care Team Updated    Patient Outreach Method:    [x]  Telephone Outreach Completed   [] Successful   [x] Left Voicemail   [] Unable to Contact (wrong number, no voicemail)  [x]  [facility] Portal Outreach Sent  []  Letter Outreach Mailed  []  Fax Sent for External Records  []  External Records Upload    Health Maintenance Topics Addressed and Outreach Outcomes / Actions Taken:        [x]      Breast Cancer Screening []  Mammo Scheduled      []  External Records Requested     []  Added Reminder to Complete to Upcoming Primary Care Appt Notes     []  Patient Declined     []  Patient Will Call Back to Schedule     []  Patient Will Schedule with External Provider / Order Routed if Applicable             []       Cervical Cancer Screening []  Pap Scheduled      []  External Records Requested     []  Added Reminder to Complete to Upcoming Primary Care Appt Notes     []  Patient Declined     []  Patient Will Call Back to Schedule     []  Patient Will Schedule with External Provider               []          Colorectal Cancer Screening []  Colonoscopy Case Request or Referral Placed     []  External Records Requested     []  Added Reminder to Complete to Upcoming Primary Care Appt Notes     []  Patient Declined     []  Patient Will Call Back to Schedule     []  Patient Will Schedule with External Provider     []  Fit Kit Mailed (add the SmartPhrase under additional notes)     []  Reminded Patient to Complete Home Test             []      Diabetic Eye Exam []  Eye Camera Scheduled or Optometry Referral Placed     []  External Records Requested     []  Added Reminder to Complete  to Upcoming Primary Care Appt Notes     []  Patient Declined     []  Patient Will Call Back to Schedule     []  Patient Will Schedule with External Provider             [x]      Blood Pressure Control []  Primary Care Follow Up Visit Scheduled     []  Remote Blood Pressure Reading Captured     []  Added Reminder to Complete to Upcoming Primary Care Appt Notes     []  Patient Declined     []  Patient Will Call Back / Patient Will Send Portal Message with Reading     []  Patient Will Call Back to Schedule Provider Visit             []       HbA1c & Other Labs []  Lab Appt Scheduled for Due Labs     []  Primary Care Follow Up Visit Scheduled      []  Reminded Patient to Complete Home Test     []  Added Reminder to Complete to Upcoming Primary Care Appt Notes     []  Patient Declined     []  Patient Will Call Back to Schedule     []  Patient Will Schedule with External Provider / Order Routed if Applicable           [x]    Schedule Primary Care Appt []  Primary Care Appt Scheduled     []  Patient Declined     []  Patient Will Call Back to Schedule     []  Pt Established with External Provider & Updated Care Team             []      Medication Adherence []  Primary Care Appointment Scheduled     []  Added Reminder to Upcoming Primary Care Appt Notes     []  Patient Reminded to  Prescription     []  Patient Declined, Provider Notified if Needed     []  Sent Provider Message to Review and/or Add Exclusion to Problem List             []      Osteoporosis Screening []  DXA Appointment Scheduled     []  External Records Requested     []  Added Reminder to Complete to Upcoming Primary Care Appt Notes     []  Patient Declined     []  Patient Will Call Back to Schedule     []  Patient Will Schedule with External Provider / Order Routed if Applicable     Additional Care Coordinator Notes:         Further Action Needed If Patient Returns Outreach:     Need remote home blood pressure reading.   Due for overdue HM (MAMMOGRAM,  VISIT WITH PCP), need to get recent records if already done. If not done, orders/referrals need to be place and schedule. Please forward messages to me.

## 2023-09-27 ENCOUNTER — PATIENT MESSAGE (OUTPATIENT)
Dept: ADMINISTRATIVE | Facility: HOSPITAL | Age: 48
End: 2023-09-27
Payer: COMMERCIAL

## 2023-09-30 DIAGNOSIS — I10 HYPERTENSION, ESSENTIAL, BENIGN: ICD-10-CM

## 2023-09-30 NOTE — TELEPHONE ENCOUNTER
No care due was identified.  [facility] Embedded Care Due Messages. Reference number: [id number].   9/30/2023 5:42:24 AM CDT

## 2023-10-02 RX ORDER — HYDROCHLOROTHIAZIDE 12.5 MG/1
TABLET ORAL
Qty: 30 TABLET | Refills: 2 | Status: ON HOLD | OUTPATIENT
Start: 2023-10-02 | End: 2023-12-08 | Stop reason: CLARIF

## 2023-10-02 NOTE — TELEPHONE ENCOUNTER
Last Office Visit Info:   The patient's last visit with Margarette Navarro MD was on 3/21/2022.    The patient's last visit in current department was on Visit date not found.        Last CBC Results:   Lab Results   Component Value Date    WBC 4.99 03/21/2022    HGB 12.8 03/21/2022    HCT 40.8 03/21/2022     03/21/2022       Last CMP Results  Lab Results   Component Value Date     03/21/2022    K 3.8 03/21/2022     03/21/2022    CO2 24 03/21/2022    BUN 7 03/21/2022    CREATININE 0.8 03/21/2022    CALCIUM 9.4 03/21/2022    ALBUMIN 4.0 03/21/2022    AST 19 03/21/2022    ALT 16 03/21/2022       Last Lipids  Lab Results   Component Value Date    CHOL 196 03/21/2022    TRIG 46 03/21/2022    HDL 75 03/21/2022    LDLCALC 111.8 03/21/2022       Last A1C  Lab Results   Component Value Date    HGBA1C 4.9 03/21/2022       Last TSH  Lab Results   Component Value Date    TSH 0.857 03/21/2022             Current Med Refills  Medication List with Changes/Refills   Current Medications    FLUTICASONE PROPIONATE (FLONASE) 50 MCG/ACTUATION NASAL SPRAY    SHAKE LIQUID AND USE 1 SPRAY(50 MCG) IN EACH NOSTRIL EVERY DAY       Start Date: 3/3/2023  End Date: --    GARLIC ORAL    Take by mouth.       Start Date: --        End Date: --    HYDROCHLOROTHIAZIDE (HYDRODIURIL) 12.5 MG TAB    TAKE 1 TABLET(12.5 MG) BY MOUTH EVERY DAY       Start Date: 4/3/2023  End Date: --       Order(s) placed per written order guidelines: none    Please advise.

## 2023-10-30 ENCOUNTER — OFFICE VISIT (OUTPATIENT)
Dept: OBSTETRICS AND GYNECOLOGY | Facility: CLINIC | Age: 48
End: 2023-10-30
Payer: MEDICAID

## 2023-10-30 VITALS
BODY MASS INDEX: 29.02 KG/M2 | HEIGHT: 64 IN | DIASTOLIC BLOOD PRESSURE: 85 MMHG | SYSTOLIC BLOOD PRESSURE: 143 MMHG | WEIGHT: 170 LBS

## 2023-10-30 DIAGNOSIS — Z01.419 ENCOUNTER FOR ANNUAL ROUTINE GYNECOLOGICAL EXAMINATION: Primary | ICD-10-CM

## 2023-10-30 DIAGNOSIS — Z11.3 SCREENING EXAMINATION FOR STD (SEXUALLY TRANSMITTED DISEASE): ICD-10-CM

## 2023-10-30 DIAGNOSIS — Z30.2 ENCOUNTER FOR FEMALE STERILIZATION PROCEDURE: ICD-10-CM

## 2023-10-30 PROCEDURE — 99386 PREV VISIT NEW AGE 40-64: CPT | Mod: S$PBB,,, | Performed by: STUDENT IN AN ORGANIZED HEALTH CARE EDUCATION/TRAINING PROGRAM

## 2023-10-30 PROCEDURE — 3077F PR MOST RECENT SYSTOLIC BLOOD PRESSURE >= 140 MM HG: ICD-10-PCS | Mod: CPTII,,, | Performed by: STUDENT IN AN ORGANIZED HEALTH CARE EDUCATION/TRAINING PROGRAM

## 2023-10-30 PROCEDURE — 1160F RVW MEDS BY RX/DR IN RCRD: CPT | Mod: CPTII,,, | Performed by: STUDENT IN AN ORGANIZED HEALTH CARE EDUCATION/TRAINING PROGRAM

## 2023-10-30 PROCEDURE — 1160F PR REVIEW ALL MEDS BY PRESCRIBER/CLIN PHARMACIST DOCUMENTED: ICD-10-PCS | Mod: CPTII,,, | Performed by: STUDENT IN AN ORGANIZED HEALTH CARE EDUCATION/TRAINING PROGRAM

## 2023-10-30 PROCEDURE — 1159F PR MEDICATION LIST DOCUMENTED IN MEDICAL RECORD: ICD-10-PCS | Mod: CPTII,,, | Performed by: STUDENT IN AN ORGANIZED HEALTH CARE EDUCATION/TRAINING PROGRAM

## 2023-10-30 PROCEDURE — 3008F BODY MASS INDEX DOCD: CPT | Mod: CPTII,,, | Performed by: STUDENT IN AN ORGANIZED HEALTH CARE EDUCATION/TRAINING PROGRAM

## 2023-10-30 PROCEDURE — 3079F PR MOST RECENT DIASTOLIC BLOOD PRESSURE 80-89 MM HG: ICD-10-PCS | Mod: CPTII,,, | Performed by: STUDENT IN AN ORGANIZED HEALTH CARE EDUCATION/TRAINING PROGRAM

## 2023-10-30 PROCEDURE — 87591 N.GONORRHOEAE DNA AMP PROB: CPT | Performed by: STUDENT IN AN ORGANIZED HEALTH CARE EDUCATION/TRAINING PROGRAM

## 2023-10-30 PROCEDURE — 99999 PR PBB SHADOW E&M-EST. PATIENT-LVL IV: CPT | Mod: PBBFAC,,, | Performed by: STUDENT IN AN ORGANIZED HEALTH CARE EDUCATION/TRAINING PROGRAM

## 2023-10-30 PROCEDURE — 99386 PR PREVENTIVE VISIT,NEW,40-64: ICD-10-PCS | Mod: S$PBB,,, | Performed by: STUDENT IN AN ORGANIZED HEALTH CARE EDUCATION/TRAINING PROGRAM

## 2023-10-30 PROCEDURE — 3079F DIAST BP 80-89 MM HG: CPT | Mod: CPTII,,, | Performed by: STUDENT IN AN ORGANIZED HEALTH CARE EDUCATION/TRAINING PROGRAM

## 2023-10-30 PROCEDURE — 99214 OFFICE O/P EST MOD 30 MIN: CPT | Mod: PBBFAC | Performed by: STUDENT IN AN ORGANIZED HEALTH CARE EDUCATION/TRAINING PROGRAM

## 2023-10-30 PROCEDURE — 81514 NFCT DS BV&VAGINITIS DNA ALG: CPT | Performed by: STUDENT IN AN ORGANIZED HEALTH CARE EDUCATION/TRAINING PROGRAM

## 2023-10-30 PROCEDURE — 99999 PR PBB SHADOW E&M-EST. PATIENT-LVL IV: ICD-10-PCS | Mod: PBBFAC,,, | Performed by: STUDENT IN AN ORGANIZED HEALTH CARE EDUCATION/TRAINING PROGRAM

## 2023-10-30 PROCEDURE — 1159F MED LIST DOCD IN RCRD: CPT | Mod: CPTII,,, | Performed by: STUDENT IN AN ORGANIZED HEALTH CARE EDUCATION/TRAINING PROGRAM

## 2023-10-30 PROCEDURE — 87491 CHLMYD TRACH DNA AMP PROBE: CPT | Performed by: STUDENT IN AN ORGANIZED HEALTH CARE EDUCATION/TRAINING PROGRAM

## 2023-10-30 PROCEDURE — 3077F SYST BP >= 140 MM HG: CPT | Mod: CPTII,,, | Performed by: STUDENT IN AN ORGANIZED HEALTH CARE EDUCATION/TRAINING PROGRAM

## 2023-10-30 PROCEDURE — 3008F PR BODY MASS INDEX (BMI) DOCUMENTED: ICD-10-PCS | Mod: CPTII,,, | Performed by: STUDENT IN AN ORGANIZED HEALTH CARE EDUCATION/TRAINING PROGRAM

## 2023-10-30 RX ORDER — NORELGESTROMIN AND ETHINYL ESTRADIOL 150; 35 UG/D; UG/D
1 PATCH TRANSDERMAL
Status: ON HOLD | COMMUNITY
Start: 2023-10-23 | End: 2023-12-08 | Stop reason: HOSPADM

## 2023-10-30 RX ORDER — NORELGESTROMIN AND ETHINYL ESTRADIOL 35; 150 UG/MG; UG/MG
1 PATCH TRANSDERMAL WEEKLY
COMMUNITY
Start: 2023-09-08 | End: 2023-11-28 | Stop reason: CLARIF

## 2023-10-30 NOTE — PROGRESS NOTES
"  Subjective:      Patient ID: Karey Palmer is a 48 y.o. female.    Chief Complaint:  Well Woman (Patient presents to establish care/ annual visit)    History of Present Illness  47 yo here to establish care and discuss a having a tubal. No acute issues today but reports her mother recently passed away from pancreatic cancer   Except for mammogram is up to date on age based screenings (listed below)  Sexually active with 1 male partner, is using BC patches currently. Previously been on DMPA and IUD but does not want to use any medication for contraception.     HM:  TVUS 2023 normal no anatomic abnormalities  Pap NILM HPV neg 2023, hx NILM HPV+ pap with LENA 1 on colpo in 2019  STI: Gc/Ct neg 2023, HIV syphilis neg 2023  Mammogram: BIRADS-1 3/2022       Annual Exam-Premenopausal  Patient presents for annual exam. The patient has no complaints today. The patient is sexually active. GYN screening history: last pap: was normal and last mammogram: was normal. The patient wears seatbelts: yes. The patient participates in regular exercise:  daily stretches . Has the patient ever been transfused or tattooed?:  tattoo, no transfusion . The patient reports that there is not domestic violence in her life.    GYN & OB History  Patient's last menstrual period was 10/23/2023 (approximate).   Date of Last Pap: No result found    OB History    Para Term  AB Living   1 1 1     2   SAB IAB Ectopic Multiple Live Births                  # Outcome Date GA Lbr Arsenio/2nd Weight Sex Delivery Anes PTL Lv   1 Term               Obstetric Comments   Twin         Review of Systems  Review of Systems   All other systems reviewed and are negative.       Objective:       Vitals:    10/30/23 1115   BP: (!) 143/85   BP Location: Right arm   Patient Position: Sitting   Weight: 77.1 kg (169 lb 15.6 oz)   Height: 5' 4" (1.626 m)        Physical Exam:   Constitutional: She appears well-developed and well-nourished.    HENT: "   Head: Normocephalic and atraumatic.    Eyes: EOM are normal.      Pulmonary/Chest: Effort normal.        Abdominal: Soft.     Genitourinary:    Vagina, uterus, right adnexa and left adnexa normal.   Rectum:      No tenderness or external hemorrhoid.      Pelvic exam was performed with patient in the lithotomy position.   The external female genitalia was normal.   Genitalia hair distrobution normal .   There is no lesion on the right labia. There is no lesion on the left labia. Cervix is normal.    Genitourinary Comments: Mobile painless bump posteriorly inside rectum  Chaperone present for duration of exam             Musculoskeletal: Normal range of motion.       Neurological: She is alert.    Skin: Skin is warm and dry.    Psychiatric: She has a normal mood and affect.         Assessment:     1. Encounter for annual routine gynecological examination    2. Screening examination for STD (sexually transmitted disease)      Plan:     1. Encounter for annual routine gynecological examination  - Pap: Due 8/2026  - Screening tests as ordered.  - Diet and exercise encouraged.  Seat belt use encouraged.  Desires BTL for birth control, Medicaid consent form signed today     Counseling: Contraception:patch and tubal ligation  Exercise  Health Screens: Mammography  Colonoscopy:UpToDate - pt has likely prolapsed internal hemorrhoid, counseled on water intake and dietary fiber    2. Screening examination for STD (sexually transmitted disease)  - C. trachomatis/N. gonorrhoeae by AMP [facility]; Cervix  - RPR; Future  - HIV 1/2 Ag/Ab (4th Gen); Future  - Vaginosis Screen by DNA Probe      Follow up for pre-op visit before BTL    Evens Oliveros III, MD  [facility] - OB GYN   [address]  [place] [address] [phone]

## 2023-10-31 ENCOUNTER — TELEPHONE (OUTPATIENT)
Dept: OBSTETRICS AND GYNECOLOGY | Facility: CLINIC | Age: 48
End: 2023-10-31
Payer: COMMERCIAL

## 2023-10-31 LAB
BACTERIAL VAGINOSIS DNA: POSITIVE
C TRACH DNA SPEC QL NAA+PROBE: NOT DETECTED
CANDIDA GLABRATA DNA: NEGATIVE
CANDIDA KRUSEI DNA: NEGATIVE
CANDIDA RRNA VAG QL PROBE: NEGATIVE
N GONORRHOEA DNA SPEC QL NAA+PROBE: NOT DETECTED
T VAGINALIS RRNA GENITAL QL PROBE: NEGATIVE

## 2023-10-31 NOTE — TELEPHONE ENCOUNTER
I called and spoke to patient re: results. I told her we only have 1 back that resulted in BV, but are still waiting for the G/C. I told her that provider has not had a second to review results, but we will contact her with further instructions. She expressed gratitude and had no further questions    ----- Message from Preeti Beckham sent at 10/31/2023  4:53 PM CDT -----    Patient Returning Call        Who Called:pt  Does the patient know what this is regarding?:need a call about results please call  Would the patient rather a call back or a response via [facility]? call  Best Call Back Number:[phone]  Additional Information: call back

## 2023-11-01 ENCOUNTER — TELEPHONE (OUTPATIENT)
Dept: OBSTETRICS AND GYNECOLOGY | Facility: CLINIC | Age: 48
End: 2023-11-01
Payer: COMMERCIAL

## 2023-11-01 ENCOUNTER — PATIENT MESSAGE (OUTPATIENT)
Dept: FAMILY MEDICINE | Facility: CLINIC | Age: 48
End: 2023-11-01
Payer: COMMERCIAL

## 2023-11-01 DIAGNOSIS — B96.89 BACTERIAL VAGINOSIS: Primary | ICD-10-CM

## 2023-11-01 DIAGNOSIS — N76.0 BACTERIAL VAGINOSIS: Primary | ICD-10-CM

## 2023-11-01 RX ORDER — METRONIDAZOLE 500 MG/1
500 TABLET ORAL EVERY 12 HOURS
Qty: 14 TABLET | Refills: 0 | Status: SHIPPED | OUTPATIENT
Start: 2023-11-01 | End: 2023-11-08

## 2023-11-01 RX ORDER — FLUTICASONE PROPIONATE 50 MCG
SPRAY, SUSPENSION (ML) NASAL
Qty: 16 G | Refills: 0 | Status: SHIPPED | OUTPATIENT
Start: 2023-11-01 | End: 2023-11-27

## 2023-11-01 NOTE — TELEPHONE ENCOUNTER
I attempted to contact patient re: medication. I lvm letting her know the rx was sent in, and if she had any problems swallowing the pills we could do a vaginal tx. Left direct cb info.    ARTURO Rainey    ----- Message from Evens Oliveros III, MD sent at 11/1/2023  2:03 PM CDT -----  Regarding: RE: self [phone]  I don't have any control over the size of the pills so if they're too big we can send in vaginal treatment :)   Flagyl Rx sent just now   ----- Message -----  From: Brigid Wong MA  Sent: 11/1/2023   8:55 AM CDT  To: Evens Oliveros III, MD  Subject: FW: self [phone]                            Please advise. All tests are back. Positive for BV  ----- Message -----  From: Bethany Hylton  Sent: 11/1/2023   8:33 AM CDT  To: [facility] Staff  Subject: self [phone]                                Type: Patient Call Back    Who called: self     What is the request in detail: pt asked for the status of her antibiotics, she also requested they be the smaller kind hat makes it easy to swallow.     Can the clinic reply by [facility]? no    Would the patient rather a call back or a response via [facility]? Call back     Best call back number: [phone]      [id number] [place] [address]  [facility] [address]  [place] [address]  Phone: [phone] Fax: [phone]

## 2023-11-01 NOTE — TELEPHONE ENCOUNTER
Care Due:                  Date            Visit Type   Department     Provider  --------------------------------------------------------------------------------                                EP -                              PRIMARY      [facility]  Last Visit: 03-      CARE ([facility])   CARRIE Navarro                              EP -                              PRIMARY      [facility]  Next Visit: 01-      CARE ([facility])   CARRIE Navarro                                                            Last  Test          Frequency    Reason                     Performed    Due Date  --------------------------------------------------------------------------------    CMP.........  12 months..  hydroCHLOROthiazide......  03- 03-    [facility] Embedded Care Due Messages. Reference number: [id number].   11/01/2023 12:01:17 AM CDT

## 2023-11-06 ENCOUNTER — PATIENT MESSAGE (OUTPATIENT)
Dept: ADMINISTRATIVE | Facility: HOSPITAL | Age: 48
End: 2023-11-06
Payer: COMMERCIAL

## 2023-11-09 ENCOUNTER — TELEPHONE (OUTPATIENT)
Dept: OBSTETRICS AND GYNECOLOGY | Facility: CLINIC | Age: 48
End: 2023-11-09
Payer: COMMERCIAL

## 2023-11-09 NOTE — TELEPHONE ENCOUNTER
I called and spoke to patient. I answered her questions to the best of my knowledge about her surgery date and whether it was approved. I told her that I would speak to provider about moving the date up or at least on a day she will be off from work. I gave her information about the billing dept in re: to any copays or denials. She had no further questions.    ARTURO Rainey  [phone]    ----- Message from Catarina Manriquez sent at 11/9/2023  9:03 AM CST -----  Regarding: Patient call back  .Type: Patient Call Back    Who called:self     What is the request in detail:states she has questions about the procedure that is scheduled for her. Would like more details.    Can the clinic reply by [facility]?call     Would the patient rather a call back or a response via [facility]? Call     Best call back number:.[phone] please call between 8 am-10 am and off on Monday & Tuesday for phone calls       Additional Information:

## 2023-11-13 ENCOUNTER — ANESTHESIA EVENT (OUTPATIENT)
Dept: SURGERY | Facility: HOSPITAL | Age: 48
End: 2023-11-13
Payer: MEDICAID

## 2023-11-14 ENCOUNTER — TELEPHONE (OUTPATIENT)
Dept: OBSTETRICS AND GYNECOLOGY | Facility: CLINIC | Age: 48
End: 2023-11-14
Payer: COMMERCIAL

## 2023-11-14 NOTE — TELEPHONE ENCOUNTER
Patient returned call. Spoke to her and let her know that I would let provider know about setting the surgery earlier if possible. She expressed understanding.    ARTURO Rainey    ----- Message from Brigid Wong MA sent at 11/13/2023  2:18 PM CST -----  Regarding: FW: Self [phone]    ----- Message -----  From: Neda Mora  Sent: 11/13/2023   1:57 PM CST  To: [facility] Staff  Subject: Self [phone]                                Type: Patient Call Back     What is the request in detail: Pt is requesting a call back in regards to her sx date scheduled for 12/08. Pt would like to talk to staff to move the date around to a date when she is off from work.     Can the clinic reply by [facility]? No     Would the patient rather a call back or a response via [facility]? Call back    Best call back number: .[phone]      Additional Information: Pt stated someone from the staff was supposed to call her back, but she never received a call.     Thank you.

## 2023-11-16 ENCOUNTER — TELEPHONE (OUTPATIENT)
Dept: OBSTETRICS AND GYNECOLOGY | Facility: CLINIC | Age: 48
End: 2023-11-16
Payer: COMMERCIAL

## 2023-11-16 NOTE — TELEPHONE ENCOUNTER
I called and lvm. Told patient to call my direct line later today when I'm free.    ARTURO Rainey    ----- Message from Blanche Price sent at 11/16/2023  9:32 AM CST -----  Type: Patient Call Back    Who called:Self    What is the request in detail:Appt date    Can the clinic reply by [facility]?No    Would the patient rather a call back or a response via [facility]? Call    Best call back number:[phone]    Additional Information:

## 2023-11-20 ENCOUNTER — TELEPHONE (OUTPATIENT)
Dept: OBSTETRICS AND GYNECOLOGY | Facility: CLINIC | Age: 48
End: 2023-11-20
Payer: COMMERCIAL

## 2023-11-20 ENCOUNTER — PATIENT MESSAGE (OUTPATIENT)
Dept: ADMINISTRATIVE | Facility: HOSPITAL | Age: 48
End: 2023-11-20
Payer: COMMERCIAL

## 2023-11-20 NOTE — TELEPHONE ENCOUNTER
I called and spoke to patient. She confirmed for her pre op appt with provider on 11/28 at . I did let her know that her insurance had approved her surgery, and the PreOp dept would be calling her to schedule her appt with them following our call. She expressed understanding and had no further questions.    ARTURO Rainey  [phone]    ----- Message from Brigid Wong MA sent at 11/17/2023  1:18 PM CST -----  Regarding: FW: self [phone]    ----- Message -----  From: Bethany Hylton  Sent: 11/17/2023   9:39 AM CST  To: [facility] Staff  Subject: self [phone]                                Type: Patient Call Back    Who called: self     What is the request in detail: pt asked for Romana to call her in the AM before 10, she asked if procedure was approved by ins.    Can the clinic reply by [facility]? no    Would the patient rather a call back or a response via [facility]? Call back    Best call back number: [phone]

## 2023-11-27 RX ORDER — FLUTICASONE PROPIONATE 50 MCG
SPRAY, SUSPENSION (ML) NASAL
Qty: 16 G | Refills: 0 | Status: SHIPPED | OUTPATIENT
Start: 2023-11-27 | End: 2023-12-19 | Stop reason: SDUPTHER

## 2023-11-27 NOTE — TELEPHONE ENCOUNTER
No care due was identified.  [facility] Embedded Care Due Messages. Reference number: [id number].   11/27/2023 7:16:02 AM CST

## 2023-11-28 ENCOUNTER — OFFICE VISIT (OUTPATIENT)
Dept: OBSTETRICS AND GYNECOLOGY | Facility: CLINIC | Age: 48
End: 2023-11-28
Payer: MEDICAID

## 2023-11-28 ENCOUNTER — HOSPITAL ENCOUNTER (OUTPATIENT)
Dept: PREADMISSION TESTING | Facility: HOSPITAL | Age: 48
Discharge: HOME | End: 2023-11-28
Attending: STUDENT IN AN ORGANIZED HEALTH CARE EDUCATION/TRAINING PROGRAM
Payer: MEDICAID

## 2023-11-28 VITALS
SYSTOLIC BLOOD PRESSURE: 140 MMHG | WEIGHT: 167.75 LBS | HEIGHT: 64 IN | DIASTOLIC BLOOD PRESSURE: 80 MMHG | RESPIRATION RATE: 18 BRPM | HEART RATE: 79 BPM | TEMPERATURE: 97 F | OXYGEN SATURATION: 100 % | BODY MASS INDEX: 28.64 KG/M2

## 2023-11-28 VITALS
SYSTOLIC BLOOD PRESSURE: 141 MMHG | DIASTOLIC BLOOD PRESSURE: 81 MMHG | HEIGHT: 64 IN | WEIGHT: 167.75 LBS | BODY MASS INDEX: 28.64 KG/M2

## 2023-11-28 DIAGNOSIS — Z01.818 PRE-OP EXAMINATION: Primary | ICD-10-CM

## 2023-11-28 DIAGNOSIS — Z01.818 PRE-OP EXAMINATION: ICD-10-CM

## 2023-11-28 DIAGNOSIS — Z01.818 PREOP TESTING: Primary | ICD-10-CM

## 2023-11-28 DIAGNOSIS — I10 HYPERTENSION, ESSENTIAL, BENIGN: ICD-10-CM

## 2023-11-28 LAB
ALBUMIN SERPL BCP-MCNC: 3.5 G/DL (ref 3.5–5.2)
ALP SERPL-CCNC: 46 U/L (ref 55–135)
ALT SERPL W/O P-5'-P-CCNC: 14 U/L (ref 10–44)
ANION GAP SERPL CALC-SCNC: 6 MMOL/L (ref 8–16)
AST SERPL-CCNC: 15 U/L (ref 10–40)
B-HCG UR QL: NEGATIVE
BASOPHILS # BLD AUTO: 0.04 K/UL (ref 0–0.2)
BASOPHILS NFR BLD: 0.6 % (ref 0–1.9)
BILIRUB SERPL-MCNC: 0.5 MG/DL (ref 0.1–1)
BUN SERPL-MCNC: 7 MG/DL (ref 6–20)
CALCIUM SERPL-MCNC: 9 MG/DL (ref 8.7–10.5)
CHLORIDE SERPL-SCNC: 107 MMOL/L (ref 95–110)
CO2 SERPL-SCNC: 25 MMOL/L (ref 23–29)
CREAT SERPL-MCNC: 0.8 MG/DL (ref 0.5–1.4)
CTP QC/QA: YES
DIFFERENTIAL METHOD: NORMAL
EOSINOPHIL # BLD AUTO: 0.1 K/UL (ref 0–0.5)
EOSINOPHIL NFR BLD: 0.8 % (ref 0–8)
ERYTHROCYTE [DISTWIDTH] IN BLOOD BY AUTOMATED COUNT: 13.1 % (ref 11.5–14.5)
EST. GFR  (NO RACE VARIABLE): >60 ML/MIN/1.73 M^2
GLUCOSE SERPL-MCNC: 75 MG/DL (ref 70–110)
HCT VFR BLD AUTO: 38.1 % (ref 37–48.5)
HGB BLD-MCNC: 12.4 G/DL (ref 12–16)
IMM GRANULOCYTES # BLD AUTO: 0.01 K/UL (ref 0–0.04)
IMM GRANULOCYTES NFR BLD AUTO: 0.2 % (ref 0–0.5)
LYMPHOCYTES # BLD AUTO: 1.9 K/UL (ref 1–4.8)
LYMPHOCYTES NFR BLD: 28 % (ref 18–48)
MCH RBC QN AUTO: 28.1 PG (ref 27–31)
MCHC RBC AUTO-ENTMCNC: 32.5 G/DL (ref 32–36)
MCV RBC AUTO: 86 FL (ref 82–98)
MONOCYTES # BLD AUTO: 0.3 K/UL (ref 0.3–1)
MONOCYTES NFR BLD: 4.8 % (ref 4–15)
NEUTROPHILS # BLD AUTO: 4.3 K/UL (ref 1.8–7.7)
NEUTROPHILS NFR BLD: 65.6 % (ref 38–73)
NRBC BLD-RTO: 0 /100 WBC
PLATELET # BLD AUTO: 281 K/UL (ref 150–450)
PMV BLD AUTO: 9.6 FL (ref 9.2–12.9)
POTASSIUM SERPL-SCNC: 4.2 MMOL/L (ref 3.5–5.1)
PROT SERPL-MCNC: 6.8 G/DL (ref 6–8.4)
RBC # BLD AUTO: 4.41 M/UL (ref 4–5.4)
SODIUM SERPL-SCNC: 138 MMOL/L (ref 136–145)
WBC # BLD AUTO: 6.61 K/UL (ref 3.9–12.7)

## 2023-11-28 PROCEDURE — 93010 ELECTROCARDIOGRAM REPORT: CPT | Mod: ,,, | Performed by: INTERNAL MEDICINE

## 2023-11-28 PROCEDURE — 99499 NO LOS: ICD-10-PCS | Mod: S$PBB,,, | Performed by: STUDENT IN AN ORGANIZED HEALTH CARE EDUCATION/TRAINING PROGRAM

## 2023-11-28 PROCEDURE — 99499 UNLISTED E&M SERVICE: CPT | Mod: S$PBB,,, | Performed by: STUDENT IN AN ORGANIZED HEALTH CARE EDUCATION/TRAINING PROGRAM

## 2023-11-28 PROCEDURE — 85025 COMPLETE CBC W/AUTO DIFF WBC: CPT | Performed by: STUDENT IN AN ORGANIZED HEALTH CARE EDUCATION/TRAINING PROGRAM

## 2023-11-28 PROCEDURE — 99999 PR PBB SHADOW E&M-EST. PATIENT-LVL III: CPT | Mod: PBBFAC,,, | Performed by: STUDENT IN AN ORGANIZED HEALTH CARE EDUCATION/TRAINING PROGRAM

## 2023-11-28 PROCEDURE — 93010 EKG 12-LEAD: ICD-10-PCS | Mod: ,,, | Performed by: INTERNAL MEDICINE

## 2023-11-28 PROCEDURE — 99999PBSHW POCT URINE PREGNANCY: ICD-10-PCS | Mod: PBBFAC,,,

## 2023-11-28 PROCEDURE — 36415 COLL VENOUS BLD VENIPUNCTURE: CPT | Performed by: STUDENT IN AN ORGANIZED HEALTH CARE EDUCATION/TRAINING PROGRAM

## 2023-11-28 PROCEDURE — 36415 COLL VENOUS BLD VENIPUNCTURE: CPT | Performed by: INTERNAL MEDICINE

## 2023-11-28 PROCEDURE — 99999PBSHW POCT URINE PREGNANCY: Mod: PBBFAC,,,

## 2023-11-28 PROCEDURE — 80053 COMPREHEN METABOLIC PANEL: CPT | Performed by: INTERNAL MEDICINE

## 2023-11-28 PROCEDURE — 93005 ELECTROCARDIOGRAM TRACING: CPT

## 2023-11-28 PROCEDURE — 99999 PR PBB SHADOW E&M-EST. PATIENT-LVL III: ICD-10-PCS | Mod: PBBFAC,,, | Performed by: STUDENT IN AN ORGANIZED HEALTH CARE EDUCATION/TRAINING PROGRAM

## 2023-11-28 PROCEDURE — 99213 OFFICE O/P EST LOW 20 MIN: CPT | Mod: PBBFAC | Performed by: STUDENT IN AN ORGANIZED HEALTH CARE EDUCATION/TRAINING PROGRAM

## 2023-11-28 PROCEDURE — 81025 URINE PREGNANCY TEST: CPT | Mod: PBBFAC | Performed by: STUDENT IN AN ORGANIZED HEALTH CARE EDUCATION/TRAINING PROGRAM

## 2023-11-28 NOTE — PROGRESS NOTES
Subjective:      Patient ID: Karey Palmer is a 48 y.o. female.    Chief Complaint:  Pre-op Exam (Patient presents for a preop exam. )      History of Present Illness  47 yo  presents for pre-op evaluation prior to planned salpingectomy.     No interval changes in health history since seeing patient in office last. Has question about salpingectomy vs burning/clipping   On birth control patch currently with no issues keeping them on. UPT neg in office today      GYN & OB History  Patient's last menstrual period was 10/23/2023 (approximate).   Date of Last Pap: No result found    OB History    Para Term  AB Living   1 1 1     2   SAB IAB Ectopic Multiple Live Births                  # Outcome Date GA Lbr Arsenio/2nd Weight Sex Delivery Anes PTL Lv   1 Term               Obstetric Comments   Twin         Review of Systems  Review of Systems   All other systems reviewed and are negative.       Objective:     Physical Exam:   Constitutional: She appears well-developed and well-nourished.    HENT:   Head: Normocephalic and atraumatic.    Eyes: Conjunctivae and EOM are normal.      Pulmonary/Chest: Effort normal.                  Musculoskeletal: Normal range of motion.       Neurological: She is alert.    Skin: Skin is warm.    Psychiatric: She has a normal mood and affect.         Assessment:     1. Pre-op examination         Plan:     1. Pre-op examination  - Reviewed plan and recommendation for salpingectomy vs clipping/burning, discussed expectation for post-op course and recovery time  - Surgical consents signed in office today, Medicaid consent previously signed on 10/30  - All questions answered, UPT negative today     - POCT Urine Pregnancy  - CBC auto differential; Future      Evens Oliveros III, MD  [facility] - OB GYN   [address]  [place] [address]   [phone]

## 2023-11-28 NOTE — DISCHARGE INSTRUCTIONS
YOUR PROCEDURE WILL BE AT [facility] at [address] [place] [address]                  Before 7 AM, enter through the Emergency Entrance..   After 7 AM enter through the Main Entrance.                 Report to the Same Day Surgery Registration Desk in the hallway.(Just beside the Same Day Surgery Unit)      Your procedure  is scheduled for ___12/8/2023_______.    Call [phone] between 2pm and 5pm on __12/7/2023_____to find out your arrival time for the day of surgery.    You may have two visitors.  No children under 12 years old.     You will be going to the Same Day Surgery Unit on the 2nd floor of the hospital.    Important instructions:  Do not eat anything after midnight.  You may have plain water, non carbonated.  You may also have Gatorade or Powerade after midnight.    Stop all fluids 2 hours before your surgery.    It is okay to brush your teeth.  Do not have gum, candy or mints.    SEE MEDICATION SHEET.   TAKE MEDICATIONS AS DIRECTED WITH SIPS OF WATER.      Do not take any diabetic medication on the morning of surgery unless instructed to do so by your doctor or pre op nurse.      All GLP-1 weekly diabetic/weight loss medications must not be taken for one week before your surgery, or your surgery could be canceled.      STOP taking Aspirin, Ibuprofen,  Advil, Motrin, Mobic(meloxicam), Aleve (naproxen), Fish oil, and Vitamin E for at least 7 days before your surgery.     You may take Tylenol if needed which is not a blood thinner.    Please shower the night before and the morning of your surgery.      Follow any Prep Instructions given by your surgeon.    Use Chlorhexidine soap as instructed by your pre op nurse.   Please place clean linens on your bed the night before surgery. Please wear fresh clean clothing after each shower.    No shaving of procedural area at least 4-5 days before surgery due to increased risk of skin irritation and/or possible  infection.    Female patients may be asked for a urine specimen on the morning of the surgery.  Please check with your nurse before using the restroom.    Contact lenses and removable denture work may not be worn during your procedure.    You may wear deodorant only. If you are having breast surgery, do not wear deodorant on the operative side.    Do not wear powder, body lotion, perfume/cologne or make-up.    Do not wear any jewelry or have any metal on your body.    You will be asked to remove any dentures or partials for the procedure.    If you are going home on the same day of surgery, you must arrange for a family member or a friend to drive you home.  Public transportation is prohibited.  You will not be able to drive home if you were given anesthesia or sedation.    Patients who want to have their Post-op prescriptions filled from our in-house [facility], bring a Credit/Debit Card or cash with you. A co-pay may be required.  The pharmacy closes at 5:30 pm.    Wear loose fitting clothes allowing for bandages.    Please leave money and valuables home.      You may bring your cell phone.    Call the doctor if fever or illness should occur before your surgery.    Call [phone] to contact us here if needed.                            CLOTHES ON DAY OF SURGERY    SHOULDER surgery:  you must have a very oversized shirt.  Very, Very large.  You will probably have a large sling on with your arm strapped to your chest.  You will not be able to put the arm of the operated shoulder into a sleeve.  You can put the arm of the un-operated shoulder into the sleeve, but the shirt will need to be draped over the operated shoulder.       ARM or HAND surgery:  make sure that your sleeves are large and loose enough to pass over large dressings or cast.      BREAST or UNDERARM surgery:  wear a loose, button down shirt so that you can dress without raising your arms over your head.    ABDOMINAL surgery:  wear loose,  comfortable clothing.  Nothing tight around the abdomen.  NO JEANS    PENIS or SCROTAL surgery:  loose comfortable clothing.  Large sweat pants, pajama pants or a robe.  ABSOLUTELY NO JEANS      LEG or FOOT surgery:  wear large loose pants that are able to pass over any large dressings or casts.  You could also wear loose shorts or a skirt.

## 2023-12-05 ENCOUNTER — LAB VISIT (OUTPATIENT)
Dept: LAB | Facility: HOSPITAL | Age: 48
End: 2023-12-05
Attending: STUDENT IN AN ORGANIZED HEALTH CARE EDUCATION/TRAINING PROGRAM
Payer: MEDICAID

## 2023-12-05 DIAGNOSIS — Z01.818 PREOP TESTING: ICD-10-CM

## 2023-12-05 LAB
ABO + RH BLD: NORMAL
B-HCG UR QL: NEGATIVE
BLD GP AB SCN CELLS X3 SERPL QL: NORMAL
SPECIMEN OUTDATE: NORMAL

## 2023-12-05 PROCEDURE — 36415 COLL VENOUS BLD VENIPUNCTURE: CPT | Performed by: STUDENT IN AN ORGANIZED HEALTH CARE EDUCATION/TRAINING PROGRAM

## 2023-12-05 PROCEDURE — 86901 BLOOD TYPING SEROLOGIC RH(D): CPT | Performed by: STUDENT IN AN ORGANIZED HEALTH CARE EDUCATION/TRAINING PROGRAM

## 2023-12-05 PROCEDURE — 81025 URINE PREGNANCY TEST: CPT | Performed by: STUDENT IN AN ORGANIZED HEALTH CARE EDUCATION/TRAINING PROGRAM

## 2023-12-07 ENCOUNTER — TELEPHONE (OUTPATIENT)
Dept: SURGERY | Facility: HOSPITAL | Age: 48
End: 2023-12-07
Payer: COMMERCIAL

## 2023-12-07 PROBLEM — Z30.09 ENCOUNTER FOR CONSULTATION FOR FEMALE STERILIZATION: Status: ACTIVE | Noted: 2023-12-07

## 2023-12-07 NOTE — ANESTHESIA PREPROCEDURE EVALUATION
[facility]  Anesthesia Pre-Operative Evaluation         Patient Name: Karey Palmer  YOB: 1975  MRN: [id number]    SUBJECTIVE:     Pre-operative evaluation for Procedure(s) (LRB):  SALPINGECTOMY, LAPAROSCOPIC (Bilateral)     12/07/2023    Karey Palmer is a 48 y.o. female w/ a significant PMHx of HTN. Plan for b/l salpingectomy.     Patient now presents for the above procedure(s).           Prev airway: None documented        Patient Active Problem List   Diagnosis    Encounter for consultation for female sterilization       Review of patient's allergies indicates:  No Known Allergies    Current Inpatient Medications:      No current facility-administered medications on file prior to encounter.     Current Outpatient Medications on File Prior to Encounter   Medication Sig Dispense Refill    GARLIC ORAL Take by mouth.      hydroCHLOROthiazide (HYDRODIURIL) 12.5 MG Tab TAKE 1 TABLET(12.5 MG) BY MOUTH EVERY DAY 30 tablet 2    XULANE 150-35 mcg/24 hr 1 patch every 7 days.         Past Surgical History:   Procedure Laterality Date    COLONOSCOPY N/A 1/13/2023    Procedure: COLONOSCOPY;  Surgeon: Suzanne Murray MD;  Location: [facility];  Service: Endoscopy;  Laterality: N/A;  12/29 instructions emailed to [email or web address]       Social History     Socioeconomic History    Marital status: Single   Tobacco Use    Smoking status: Never    Smokeless tobacco: Never   Substance and Sexual Activity    Alcohol use: No    Drug use: No    Sexual activity: Yes     Partners: Male     Birth control/protection: Patch   Social History Narrative    Drives tour buses       OBJECTIVE:     Vital Signs Range (Last 24H):         Significant Labs:  Lab Results   Component Value Date    WBC 6.61 11/28/2023    HGB 12.4 11/28/2023    HCT 38.1 11/28/2023     11/28/2023    CHOL 196 03/21/2022    TRIG 46 03/21/2022    HDL 75 03/21/2022    ALT 14 11/28/2023    AST 15 11/28/2023    NA  138 11/28/2023    K 4.2 11/28/2023     11/28/2023    CREATININE 0.8 11/28/2023    BUN 7 11/28/2023    CO2 25 11/28/2023    TSH 0.857 03/21/2022    HGBA1C 4.9 03/21/2022       Diagnostic Studies: No relevant studies.    EKG:   Results for orders placed or performed during the hospital encounter of 11/28/23   EKG 12-lead    Collection Time: 11/28/23  2:05 PM    Narrative    Test Reason : Z01.818,    Vent. Rate : 071 BPM     Atrial Rate : 071 BPM     P-R Int : 184 ms          QRS Dur : 082 ms      QT Int : 394 ms       P-R-T Axes : 067 040 042 degrees     QTc Int : 428 ms    Normal sinus rhythm  Normal ECG  No previous ECGs available  Confirmed by Itz Rincon MD ([id number]) on 11/29/2023 10:17:20 AM    Referred By:             Confirmed By:Itz Rincon MD       2D ECHO:  TTE:  No results found for this or any previous visit.    HUGO:  No results found for this or any previous visit.    ASSESSMENT/PLAN:           Pre-op Assessment    I have reviewed the Patient Summary Reports.     I have reviewed the Nursing Notes. I have reviewed the NPO Status.   I have reviewed the Medications.     Review of Systems  Anesthesia Hx:             Denies Family Hx of Anesthesia complications.    Denies Personal Hx of Anesthesia complications.                    Hematology/Oncology:  Hematology Normal   Oncology Normal                                   EENT/Dental:  EENT/Dental Normal           Cardiovascular:     Hypertension                                        Pulmonary:  Pulmonary Normal                       Renal/:  Renal/ Normal                 Hepatic/GI:  Hepatic/GI Normal                 Musculoskeletal:  Musculoskeletal Normal                Neurological:  Neurology Normal                                      Endocrine:  Endocrine Normal            Dermatological:  Skin Normal    Psych:  Psychiatric Normal                  Physical Exam  General: Well nourished, Cooperative, Alert and Oriented    Airway:  Mallampati:  II / I  Mouth Opening: Normal  TM Distance: Normal  Tongue: Normal  Neck ROM: Normal ROM    Dental:  Intact    Chest/Lungs:  Normal Respiratory Rate    Heart:  Rate: Normal      Anesthesia Plan  Type of Anesthesia, risks & benefits discussed:    Anesthesia Type: Gen ETT  Intra-op Monitoring Plan: Standard ASA Monitors  Post Op Pain Control Plan: multimodal analgesia and IV/PO Opioids PRN  Induction:  IV  Airway Plan: Direct and Video, Post-Induction  Informed Consent: Informed consent signed with the Patient and all parties understand the risks and agree with anesthesia plan.  All questions answered. Patient consented to blood products? No  ASA Score: 2  Day of Surgery Review of History & Physical: H&P Update referred to the surgeon/provider.    Ready For Surgery From Anesthesia Perspective.     .

## 2023-12-07 NOTE — H&P
[facility]  Obstetrics & Gynecology  History & Physical    Patient Name: Karey Palmer  MRN: [id number]  Admission Date: 2023  Primary Care Provider: Margarette Navarro MD    Subjective:     Chief Complaint/Reason for Admission: Bilateral salpingectomy    History of Present Illness: 49 yo  presents for laparoscopic bilateral salpingectomy. Patient was evaluated in the office for annual exam and voiced desire for permanent sterilization after counseling on all contraceptive options. Had been on birth control patch but now has no desire for future childbearing, reviewed permanent nature of procedure and recommendation against any future attempt at reversing procedure.     Medicaid sterilization consent signed in office 10/30/23    No current facility-administered medications on file prior to encounter.     Current Outpatient Medications on File Prior to Encounter   Medication Sig    GARLIC ORAL Take by mouth.    XULANE 150-35 mcg/24 hr 1 patch every 7 days.    [DISCONTINUED] hydroCHLOROthiazide (HYDRODIURIL) 12.5 MG Tab TAKE 1 TABLET(12.5 MG) BY MOUTH EVERY DAY       Review of patient's allergies indicates:  No Known Allergies    Past Medical History:   Diagnosis Date    Allergy     Essential (primary) hypertension      OB History    Para Term  AB Living   1 1 1     2   SAB IAB Ectopic Multiple Live Births                  # Outcome Date GA Lbr Arsenio/2nd Weight Sex Delivery Anes PTL Lv   1 Term               Obstetric Comments   Twin       Past Surgical History:   Procedure Laterality Date    COLONOSCOPY N/A 2023    Procedure: COLONOSCOPY;  Surgeon: Suzanne Murray MD;  Location: [facility];  Service: Endoscopy;  Laterality: N/A;   instructions emailed to [email or web address]     Family History       Problem Relation (Age of Onset)    Breast cancer Paternal Grandmother, Maternal Aunt, Cousin (30), Other    Pancreatic cancer Mother          Tobacco Use    Smoking  status: Never    Smokeless tobacco: Never   Substance and Sexual Activity    Alcohol use: No    Drug use: No    Sexual activity: Yes     Partners: Male     Birth control/protection: Patch     Review of Systems   All other systems reviewed and are negative.      Objective:     Vital Signs (Most Recent):  Temp: 98.3 °F (36.8 °C) (12/08/23 0609)  Pulse: 75 (12/08/23 0609)  Resp: 18 (12/08/23 0609)  BP: (!) 150/82 (12/08/23 0609)  SpO2: 98 % (12/08/23 0609) Vital Signs (24h Range):  Temp:  [98.3 °F (36.8 °C)] 98.3 °F (36.8 °C)  Pulse:  [75] 75  Resp:  [18] 18  SpO2:  [98 %] 98 %  BP: (150)/(82) 150/82     Weight: 76.1 kg (167 lb 12 oz)  Body mass index is 28.79 kg/m².  Patient's last menstrual period was 10/23/2023 (approximate).    Physical Exam:   Constitutional: She is oriented to person, place, and time. She appears well-developed and well-nourished.    HENT:   Head: Normocephalic and atraumatic.    Eyes: Conjunctivae and EOM are normal.      Pulmonary/Chest: Effort normal.        Abdominal: Soft.             Musculoskeletal: Normal range of motion.       Neurological: She is alert and oriented to person, place, and time.    Skin: Skin is warm and dry.    Psychiatric: She has a normal mood and affect.       Laboratory:  Urine Pregnancy Test:   Recent Labs   Lab Result Units 12/05/23  1106   Preg Test, Ur  Negative         I have personallly reviewed all pertinent lab results from the last 24 hours.        Assessment/Plan:     Active Diagnoses:    Diagnosis Date Noted POA    PRINCIPAL PROBLEM:  Encounter for consultation for female sterilization [Z30.09] 12/07/2023 Unknown      Problems Resolved During this Admission:     - H&P updated morning of procedure accordingly, no interval changes in health history  - All patient questions answered, desires to proceed with laparoscopic bilateral salpingectomy         Iii Evens Oliveros MD  Obstetrics & Gynecology  [facility] - Surgery

## 2023-12-08 ENCOUNTER — ANESTHESIA (OUTPATIENT)
Dept: SURGERY | Facility: HOSPITAL | Age: 48
End: 2023-12-08
Payer: MEDICAID

## 2023-12-08 ENCOUNTER — HOSPITAL ENCOUNTER (OUTPATIENT)
Facility: HOSPITAL | Age: 48
Discharge: HOME | End: 2023-12-08
Attending: STUDENT IN AN ORGANIZED HEALTH CARE EDUCATION/TRAINING PROGRAM | Admitting: STUDENT IN AN ORGANIZED HEALTH CARE EDUCATION/TRAINING PROGRAM
Payer: MEDICAID

## 2023-12-08 VITALS
HEART RATE: 88 BPM | TEMPERATURE: 98 F | RESPIRATION RATE: 18 BRPM | OXYGEN SATURATION: 98 % | WEIGHT: 167.75 LBS | SYSTOLIC BLOOD PRESSURE: 146 MMHG | DIASTOLIC BLOOD PRESSURE: 80 MMHG | BODY MASS INDEX: 28.79 KG/M2

## 2023-12-08 DIAGNOSIS — Z30.2 REQUEST FOR STERILIZATION: ICD-10-CM

## 2023-12-08 DIAGNOSIS — Z30.2 ENCOUNTER FOR FEMALE STERILIZATION PROCEDURE: Primary | ICD-10-CM

## 2023-12-08 PROBLEM — I10 ESSENTIAL HYPERTENSION: Status: ACTIVE | Noted: 2022-10-12

## 2023-12-08 LAB
ABO + RH BLD: NORMAL
BLD GP AB SCN CELLS X3 SERPL QL: NORMAL

## 2023-12-08 PROCEDURE — D9220A PRA ANESTHESIA: ICD-10-PCS | Mod: ANES,,, | Performed by: ANESTHESIOLOGY

## 2023-12-08 PROCEDURE — D9220A PRA ANESTHESIA: Mod: ANES,,, | Performed by: ANESTHESIOLOGY

## 2023-12-08 PROCEDURE — 71000016 HC POSTOP RECOV ADDL HR: Performed by: STUDENT IN AN ORGANIZED HEALTH CARE EDUCATION/TRAINING PROGRAM

## 2023-12-08 PROCEDURE — 27201423 OPTIME MED/SURG SUP & DEVICES STERILE SUPPLY: Performed by: STUDENT IN AN ORGANIZED HEALTH CARE EDUCATION/TRAINING PROGRAM

## 2023-12-08 PROCEDURE — 63600175 PHARM REV CODE 636 W HCPCS: Performed by: NURSE ANESTHETIST, CERTIFIED REGISTERED

## 2023-12-08 PROCEDURE — D9220A PRA ANESTHESIA: ICD-10-PCS | Mod: CRNA,,, | Performed by: NURSE ANESTHETIST, CERTIFIED REGISTERED

## 2023-12-08 PROCEDURE — 36000708 HC OR TIME LEV III 1ST 15 MIN: Performed by: STUDENT IN AN ORGANIZED HEALTH CARE EDUCATION/TRAINING PROGRAM

## 2023-12-08 PROCEDURE — 58661 PR LAP,RMV  ADNEXAL STRUCTURE: ICD-10-PCS | Mod: 50,,, | Performed by: STUDENT IN AN ORGANIZED HEALTH CARE EDUCATION/TRAINING PROGRAM

## 2023-12-08 PROCEDURE — 71000015 HC POSTOP RECOV 1ST HR: Performed by: STUDENT IN AN ORGANIZED HEALTH CARE EDUCATION/TRAINING PROGRAM

## 2023-12-08 PROCEDURE — 37000009 HC ANESTHESIA EA ADD 15 MINS: Performed by: STUDENT IN AN ORGANIZED HEALTH CARE EDUCATION/TRAINING PROGRAM

## 2023-12-08 PROCEDURE — 88302 TISSUE EXAM BY PATHOLOGIST: CPT | Mod: 26,,, | Performed by: PATHOLOGY

## 2023-12-08 PROCEDURE — 71000039 HC RECOVERY, EACH ADD'L HOUR: Performed by: STUDENT IN AN ORGANIZED HEALTH CARE EDUCATION/TRAINING PROGRAM

## 2023-12-08 PROCEDURE — 88302 TISSUE EXAM BY PATHOLOGIST: CPT | Performed by: PATHOLOGY

## 2023-12-08 PROCEDURE — 71000033 HC RECOVERY, INTIAL HOUR: Performed by: STUDENT IN AN ORGANIZED HEALTH CARE EDUCATION/TRAINING PROGRAM

## 2023-12-08 PROCEDURE — 63600175 PHARM REV CODE 636 W HCPCS: Performed by: STUDENT IN AN ORGANIZED HEALTH CARE EDUCATION/TRAINING PROGRAM

## 2023-12-08 PROCEDURE — D9220A PRA ANESTHESIA: Mod: CRNA,,, | Performed by: NURSE ANESTHETIST, CERTIFIED REGISTERED

## 2023-12-08 PROCEDURE — 37000008 HC ANESTHESIA 1ST 15 MINUTES: Performed by: STUDENT IN AN ORGANIZED HEALTH CARE EDUCATION/TRAINING PROGRAM

## 2023-12-08 PROCEDURE — 86901 BLOOD TYPING SEROLOGIC RH(D): CPT | Performed by: STUDENT IN AN ORGANIZED HEALTH CARE EDUCATION/TRAINING PROGRAM

## 2023-12-08 PROCEDURE — 58661 LAPAROSCOPY REMOVE ADNEXA: CPT | Mod: 50,,, | Performed by: STUDENT IN AN ORGANIZED HEALTH CARE EDUCATION/TRAINING PROGRAM

## 2023-12-08 PROCEDURE — 88302 PR  SURG PATH,LEVEL II: ICD-10-PCS | Mod: 26,,, | Performed by: PATHOLOGY

## 2023-12-08 PROCEDURE — 58661 PR LAP,RMV  ADNEXAL STRUCTURE: ICD-10-PCS | Mod: 80,50,, | Performed by: OBSTETRICS & GYNECOLOGY

## 2023-12-08 PROCEDURE — 25000003 PHARM REV CODE 250: Performed by: STUDENT IN AN ORGANIZED HEALTH CARE EDUCATION/TRAINING PROGRAM

## 2023-12-08 PROCEDURE — 58661 LAPAROSCOPY REMOVE ADNEXA: CPT | Mod: 80,50,, | Performed by: OBSTETRICS & GYNECOLOGY

## 2023-12-08 PROCEDURE — 36000709 HC OR TIME LEV III EA ADD 15 MIN: Performed by: STUDENT IN AN ORGANIZED HEALTH CARE EDUCATION/TRAINING PROGRAM

## 2023-12-08 PROCEDURE — 25000003 PHARM REV CODE 250: Performed by: NURSE ANESTHETIST, CERTIFIED REGISTERED

## 2023-12-08 PROCEDURE — 36415 COLL VENOUS BLD VENIPUNCTURE: CPT | Performed by: STUDENT IN AN ORGANIZED HEALTH CARE EDUCATION/TRAINING PROGRAM

## 2023-12-08 RX ORDER — LIDOCAINE HYDROCHLORIDE 20 MG/ML
INJECTION INTRAVENOUS
Status: DISCONTINUED | OUTPATIENT
Start: 2023-12-08 | End: 2023-12-08

## 2023-12-08 RX ORDER — DIPHENHYDRAMINE HCL 25 MG
25 CAPSULE ORAL EVERY 4 HOURS PRN
Status: DISCONTINUED | OUTPATIENT
Start: 2023-12-08 | End: 2023-12-08 | Stop reason: HOSPADM

## 2023-12-08 RX ORDER — IBUPROFEN 600 MG/1
600 TABLET ORAL EVERY 6 HOURS PRN
Qty: 30 TABLET | Refills: 1 | Status: SHIPPED | OUTPATIENT
Start: 2023-12-08

## 2023-12-08 RX ORDER — DEXTROMETHORPHAN HYDROBROMIDE, GUAIFENESIN 5; 100 MG/5ML; MG/5ML
650 LIQUID ORAL EVERY 8 HOURS
Qty: 30 TABLET | Refills: 1 | OUTPATIENT
Start: 2023-12-08 | End: 2024-03-16

## 2023-12-08 RX ORDER — ONDANSETRON 2 MG/ML
INJECTION INTRAMUSCULAR; INTRAVENOUS
Status: DISCONTINUED | OUTPATIENT
Start: 2023-12-08 | End: 2023-12-08

## 2023-12-08 RX ORDER — HYDROMORPHONE HYDROCHLORIDE 2 MG/ML
0.2 INJECTION, SOLUTION INTRAMUSCULAR; INTRAVENOUS; SUBCUTANEOUS EVERY 5 MIN PRN
Status: DISCONTINUED | OUTPATIENT
Start: 2023-12-08 | End: 2023-12-08 | Stop reason: HOSPADM

## 2023-12-08 RX ORDER — MIDAZOLAM HYDROCHLORIDE 1 MG/ML
INJECTION INTRAMUSCULAR; INTRAVENOUS
Status: DISCONTINUED | OUTPATIENT
Start: 2023-12-08 | End: 2023-12-08

## 2023-12-08 RX ORDER — SODIUM CHLORIDE 9 MG/ML
INJECTION, SOLUTION INTRAVENOUS CONTINUOUS
Status: ACTIVE | OUTPATIENT
Start: 2023-12-08

## 2023-12-08 RX ORDER — HALOPERIDOL 5 MG/ML
0.5 INJECTION INTRAMUSCULAR EVERY 10 MIN PRN
Status: DISCONTINUED | OUTPATIENT
Start: 2023-12-08 | End: 2023-12-08 | Stop reason: HOSPADM

## 2023-12-08 RX ORDER — ROCURONIUM BROMIDE 10 MG/ML
INJECTION, SOLUTION INTRAVENOUS
Status: DISCONTINUED | OUTPATIENT
Start: 2023-12-08 | End: 2023-12-08

## 2023-12-08 RX ORDER — PROCHLORPERAZINE EDISYLATE 5 MG/ML
5 INJECTION INTRAMUSCULAR; INTRAVENOUS EVERY 6 HOURS PRN
Status: DISCONTINUED | OUTPATIENT
Start: 2023-12-08 | End: 2023-12-08 | Stop reason: HOSPADM

## 2023-12-08 RX ORDER — PROPOFOL 10 MG/ML
VIAL (ML) INTRAVENOUS
Status: DISCONTINUED | OUTPATIENT
Start: 2023-12-08 | End: 2023-12-08

## 2023-12-08 RX ORDER — DEXAMETHASONE SODIUM PHOSPHATE 4 MG/ML
INJECTION, SOLUTION INTRA-ARTICULAR; INTRALESIONAL; INTRAMUSCULAR; INTRAVENOUS; SOFT TISSUE
Status: DISCONTINUED | OUTPATIENT
Start: 2023-12-08 | End: 2023-12-08

## 2023-12-08 RX ORDER — ACETAMINOPHEN 500 MG
1000 TABLET ORAL
Status: COMPLETED | OUTPATIENT
Start: 2023-12-08 | End: 2023-12-08

## 2023-12-08 RX ORDER — FENTANYL CITRATE 50 UG/ML
INJECTION, SOLUTION INTRAMUSCULAR; INTRAVENOUS
Status: DISCONTINUED | OUTPATIENT
Start: 2023-12-08 | End: 2023-12-08

## 2023-12-08 RX ORDER — ONDANSETRON 4 MG/1
8 TABLET, ORALLY DISINTEGRATING ORAL EVERY 8 HOURS PRN
Status: DISCONTINUED | OUTPATIENT
Start: 2023-12-08 | End: 2023-12-08 | Stop reason: HOSPADM

## 2023-12-08 RX ORDER — FAMOTIDINE 20 MG/1
20 TABLET, FILM COATED ORAL
Status: COMPLETED | OUTPATIENT
Start: 2023-12-08 | End: 2023-12-08

## 2023-12-08 RX ORDER — DIPHENHYDRAMINE HYDROCHLORIDE 50 MG/ML
25 INJECTION INTRAMUSCULAR; INTRAVENOUS EVERY 4 HOURS PRN
Status: DISCONTINUED | OUTPATIENT
Start: 2023-12-08 | End: 2023-12-08 | Stop reason: HOSPADM

## 2023-12-08 RX ORDER — HYDROCODONE BITARTRATE AND ACETAMINOPHEN 5; 325 MG/1; MG/1
1 TABLET ORAL EVERY 4 HOURS PRN
Status: DISCONTINUED | OUTPATIENT
Start: 2023-12-08 | End: 2023-12-08 | Stop reason: HOSPADM

## 2023-12-08 RX ORDER — MUPIROCIN 20 MG/G
OINTMENT TOPICAL
Status: DISPENSED | OUTPATIENT
Start: 2023-12-08

## 2023-12-08 RX ORDER — OXYCODONE HYDROCHLORIDE 5 MG/1
5 TABLET ORAL EVERY 4 HOURS PRN
Qty: 30 TABLET | Refills: 0 | Status: SHIPPED | OUTPATIENT
Start: 2023-12-08 | End: 2024-02-23

## 2023-12-08 RX ADMIN — MIDAZOLAM HYDROCHLORIDE 2 MG: 1 INJECTION INTRAMUSCULAR; INTRAVENOUS at 07:12

## 2023-12-08 RX ADMIN — PROPOFOL 170 MG: 10 INJECTION, EMULSION INTRAVENOUS at 07:12

## 2023-12-08 RX ADMIN — MUPIROCIN: 20 OINTMENT TOPICAL at 06:12

## 2023-12-08 RX ADMIN — FENTANYL CITRATE 50 MCG: 50 INJECTION, SOLUTION INTRAMUSCULAR; INTRAVENOUS at 08:12

## 2023-12-08 RX ADMIN — PROPOFOL 30 MG: 10 INJECTION, EMULSION INTRAVENOUS at 08:12

## 2023-12-08 RX ADMIN — FENTANYL CITRATE 25 MCG: 50 INJECTION, SOLUTION INTRAMUSCULAR; INTRAVENOUS at 08:12

## 2023-12-08 RX ADMIN — ONDANSETRON 4 MG: 2 INJECTION, SOLUTION INTRAMUSCULAR; INTRAVENOUS at 07:12

## 2023-12-08 RX ADMIN — DEXAMETHASONE SODIUM PHOSPHATE 4 MG: 4 INJECTION, SOLUTION INTRAMUSCULAR; INTRAVENOUS at 07:12

## 2023-12-08 RX ADMIN — PROCHLORPERAZINE EDISYLATE 5 MG: 5 INJECTION INTRAMUSCULAR; INTRAVENOUS at 11:12

## 2023-12-08 RX ADMIN — ACETAMINOPHEN 1000 MG: 500 TABLET, FILM COATED ORAL at 06:12

## 2023-12-08 RX ADMIN — ROCURONIUM BROMIDE 50 MG: 10 INJECTION, SOLUTION INTRAVENOUS at 07:12

## 2023-12-08 RX ADMIN — LIDOCAINE HYDROCHLORIDE 100 MG: 20 INJECTION, SOLUTION INTRAVENOUS at 07:12

## 2023-12-08 RX ADMIN — FENTANYL CITRATE 100 MCG: 50 INJECTION, SOLUTION INTRAMUSCULAR; INTRAVENOUS at 07:12

## 2023-12-08 RX ADMIN — SODIUM CHLORIDE, SODIUM LACTATE, POTASSIUM CHLORIDE, AND CALCIUM CHLORIDE: .6; .31; .03; .02 INJECTION, SOLUTION INTRAVENOUS at 07:12

## 2023-12-08 RX ADMIN — FAMOTIDINE 20 MG: 20 TABLET ORAL at 06:12

## 2023-12-08 RX ADMIN — HYDROCODONE BITARTRATE AND ACETAMINOPHEN 1 TABLET: 5; 325 TABLET ORAL at 10:12

## 2023-12-08 RX ADMIN — SUGAMMADEX 200 MG: 100 INJECTION, SOLUTION INTRAVENOUS at 08:12

## 2023-12-08 NOTE — OP NOTE
[facility] - Surgery  General Surgery  Operative Note    SUMMARY     Date of Procedure: 12/8/2023     Procedure: Laparoscopic bilateral partial salpingectomy     Surgeon(s) and Role:     * Evens Oliveros III, MD - Primary     * Lalito Reinsoo MD - Co-Surgeon    Pre-Operative Diagnosis: Encounter for female sterilization procedure [Z30.2]    Post-Operative Diagnosis: Post-Op Diagnosis Codes:     * Encounter for female sterilization procedure [Z30.2]    Anesthesia: General ETA    Operative Findings (including complications, if any):   Filmy adhesions to anterior abdominal wall  Normal appearing liver edge and appendix, normal appearing bowels  Dilated fallopian tubes bilaterally  Extensive adhesions between pelvic sidewall, fallopian tubes/ovaries, uterus, posterior cul-de-sac    Description of Technical Procedures: Patient was taken to the OR with IV fluids running, she was placed under general anesthesia without difficulty. She was positioned in dorsal lithotomy with Obi stirrups and then prepped/draped in the usual sterile manner. A time out was performed. Entry was performed at the umbilicus with a Veress needle, opening pressure <10mmHg confirming intra-abdominal placement. Direct entry was performed with an Optiview trocar. LLQ and RLQ 5mm trocars were placed under direct visualization. The patient was placed in Trendelenberg and then thin adhesions to the anterior abdominal wall were taken down with blunt dissection and the Ligasure device.     Attention was turned to the pelvis were extensive adhesions were noted between uterus, fallopian tubes and ovaries and the pelvic sidewalls. The right fallopian tube was noted to be dilated, and was gently dissected away from the underlying tissues with blunt dissection and the Ligasure. The tube could not be fully dissected out so a partial salpingectomy was performed and the tubal segment was set aside for removal.   The left tube was similarly dissected out from the  sidewall adhesions but the full tube could not be safely extricated so a partial salpingectomy was performed on this side as well.    The pelvis was irrigated and excellent hemostasis was noted. The 10mm scope was switched out for a 5mm scope so the tubal segments could be removed through the 10mm trocar under direct visualization, these were passed off the field to be sent for pathology.     All instruments were removed. The sponge and instrument count was correct x2.   Port sites were closed with 3-0 vicry and covered with Dermabond.  Patient tolerated the procedure well     Estimated Blood Loss (EBL): 15cc           Implants: * No implants in log *    Specimens:   Specimen (24h ago, onward)       Start     Ordered    12/08/23 0748  Specimen to Pathology, Surgery Gynecology and Obstetrics  Once        Comments: Pre-op Diagnosis: Encounter for female sterilization procedure [Z30.2]Procedure(s):SALPINGECTOMY, LAPAROSCOPIC Number of specimens: 1Name of specimens: Bilateral Fallopian Tubes     References:    Click here for ordering Quick Tip   Question Answer Comment   Procedure Type: Gynecology and Obstetrics    Specimen Class: Routine/Screening    Which provider would you like to cc? EVENS OLIVEROS III    Release to patient Immediate        12/08/23 0836                            Condition: Good    Disposition: PACU - hemodynamically stable.    Attestation: I was present and scrubbed for the entire procedure.    Evens Oliveros III, MD

## 2023-12-08 NOTE — DISCHARGE INSTRUCTIONS
What you should know:          Dermabond / Prineotape    or a material like it was used on your incision.   It is like a liquid glue.   Do not peel or try to remove it it will start to fall off in 7-10 days on its' own.   It is OK to shower, pat dry, do not apply any creams or lotions.    No tub baths, swimming pools, hot tubs or submersion of the incision until your surgeon says it's ok.     ACTIVITY LEVEL: If you have received sedation or an anesthetic, you may feel sleepy for   several hours. Rest until you are more awake. Gradually resume your normal activities  No heavy lifting or straining, nothing over 10 lbs., like a gallon of milk.  Pelvic rest- no sex, tampons or douching until follow up or instructed by doctor.  DIET:  You may resume your home diet. If nausea is present, increase your diet gradually with fluids and bland foods.    Medications:  Pain medication should be taken only if needed and as directed. If antibiotics are prescribed, the medication should be taken until completed. You will be given an updated list of you medications.  No driving, alcoholic beverages or signing legal documents for next 24 hours or while taking pain medication    CALL THE DOCTOR:   For any obvious bleeding (some dried blood over the incision is normal).     Redness, swelling, foul smell around incision or fever over 101.  Shortness of breath, Coughing up Bloody Sputum or Pains or Swelling in your calves.  Persistent pain or nausea not relieved by medication.  If vaginal bleeding is in excess of a normal period.  Problems urinating    If any unusual problems or difficulties occur contact your doctor. If you cannot contact your doctor but feel your signs and symptoms warrant a physicians attention return to the emergency room.

## 2023-12-08 NOTE — DISCHARGE SUMMARY
[facility]  Obstetrics & Gynecology  Discharge Summary    Patient Name: Karey Palmer  MRN: [id number]  Admission Date: 12/8/2023  Hospital Length of Stay: 0 days  Discharge Date and Time:  12/08/2023 1:26 PM  Attending Physician: No att. providers found   Discharging Provider: Pernell Oliveros MD  Primary Care Provider: Margarette Navarro MD    HPI:  Pt presented for scheduled laparoscopic bilateral salpingectomy, had been counseled and consented previously in clinic. Reported no changes to health status or history since pre-op visit    Hospital Course:  Proceeded with surgery as scheduled, see separate op report for full details. A bilateral partial salpingectomy was performed due to adhesions.    She had an uneventful post-op course and recovered well. Will follow up in clinic    Goals of Care Treatment Preferences:  Code Status: Full Code    Procedure(s) (LRB):  SALPINGECTOMY, LAPAROSCOPIC (Bilateral)       Pending Diagnostic Studies:       Procedure Component Value Units Date/Time    Specimen to Pathology, Surgery Gynecology and Obstetrics [[id number]] Collected: 12/08/23 0836    Order Status: Sent Lab Status: In process Updated: 12/08/23 1041    Specimen: Tissue           Final Active Diagnoses:    Diagnosis Date Noted POA    PRINCIPAL PROBLEM:  Encounter for female sterilization procedure [Z30.2] 12/07/2023 Not Applicable      Problems Resolved During this Admission:        Discharged Condition: good    Disposition: Home or Self Care    Follow Up:   Follow-up Information       Evens Oliveros III, MD. Schedule an appointment as soon as possible for a visit in 2 week(s).    Specialty: Obstetrics and Gynecology  Contact information:  [address] [place] [id number] [phone]                           Patient Instructions:   No discharge procedures on file.  Medications:  Reconciled Home Medications:      Medication List        START taking these medications      acetaminophen 650 MG  Tbsr  Commonly known as: TYLENOL  Take 1 tablet (650 mg total) by mouth every 8 (eight) hours.     ibuprofen 600 MG tablet  Commonly known as: ADVIL,MOTRIN  Take 1 tablet (600 mg total) by mouth every 6 (six) hours as needed for Pain.     oxyCODONE 5 MG immediate release tablet  Commonly known as: ROXICODONE  Take 1 to 2 tablets by mouth every 4 to 6 hours as needed for pain).            CONTINUE taking these medications      fluticasone propionate 50 mcg/actuation nasal spray  Commonly known as: FLONASE  SHAKE LIQUID AND USE 1 SPRAY(50 MCG) IN EACH NOSTRIL EVERY DAY     GARLIC ORAL  Take by mouth.            STOP taking these medications      XULANE 150-35 mcg/24 hr  Generic drug: norelgestromin-ethinyl estradiol              Iii Evens Oliveros MD  Obstetrics & Gynecology  [facility] - Surgery

## 2023-12-08 NOTE — ANESTHESIA PROCEDURE NOTES
Intubation    Date/Time: 12/8/2023 7:24 AM    Performed by: Kelvin Pruett CRNA  Authorized by: Eric Wagner MD    Intubation:     Induction:  Intravenous    Intubated:  Postinduction    Mask Ventilation:  Easy mask    Attempts:  1    Method of Intubation:  Direct and video laryngoscopy    Blade:  Vasquez 3    Laryngeal View Grade: Grade I - full view of cords      Difficult Airway Encountered?: No      Complications:  None    Airway Device:  Oral endotracheal tube    Airway Device Size:  7.0    Style/Cuff Inflation:  Cuffed    Inflation Amount (mL):  5    Tube secured:  20    Secured at:  The teeth    Placement Verified By:  Capnometry    Complicating Factors:  None    Findings Post-Intubation:  BS equal bilateral and atraumatic/condition of teeth unchanged

## 2023-12-08 NOTE — TRANSFER OF CARE
Anesthesia Transfer of Care Note    Patient: Karey Palmer    Procedure(s) Performed: Procedure(s) (LRB):  SALPINGECTOMY, LAPAROSCOPIC (Bilateral)    Patient location: PACU    Anesthesia Type: general    Transport from OR: Transported from OR on room air with adequate spontaneous ventilation    Post pain: adequate analgesia    Post assessment: no apparent anesthetic complications and tolerated procedure well    Post vital signs: stable    Level of consciousness: awake    Nausea/Vomiting: no nausea/vomiting    Complications: none    Transfer of care protocol was followed      Last vitals: Visit Vitals  BP (!) 164/79   Pulse 87   Temp 36.4 °C (97.6 °F)   Resp 18   Wt 76.1 kg (167 lb 12 oz)   LMP 10/23/2023 (Approximate)   SpO2 98%   Breastfeeding No   BMI 28.79 kg/m²

## 2023-12-08 NOTE — ANESTHESIA POSTPROCEDURE EVALUATION
Anesthesia Post Evaluation    Patient: Karey Palmer    Procedure(s) Performed: Procedure(s) (LRB):  SALPINGECTOMY, LAPAROSCOPIC (Bilateral)    Final Anesthesia Type: general      Patient location during evaluation: PACU  Patient participation: Yes- Able to Participate  Level of consciousness: awake and alert  Post-procedure vital signs: reviewed and stable  Pain management: adequate  Airway patency: patent  DELMIS mitigation strategies: Multimodal analgesia, Extubation while patient is awake, Verification of full reversal of neuromuscular block and Extubation and recovery carried out in lateral, semiupright, or other nonsupine position  PONV status at discharge: nausea (controlled)  Anesthetic complications: no      Cardiovascular status: blood pressure returned to baseline  Respiratory status: unassisted, spontaneous ventilation and room air  Hydration status: euvolemic  Follow-up not needed.              Vitals Value Taken Time   /70 12/08/23 0946   Temp 36.4 °C (97.6 °F) 12/08/23 0903   Pulse 70 12/08/23 1001   Resp 26 12/08/23 1001   SpO2 97 % 12/08/23 1001   Vitals shown include unvalidated device data.      No case tracking events are documented in the log.      Pain/Julieta Score: Pain Rating Prior to Med Admin: 0 (12/8/2023  7:12 AM)  Pain Rating Post Med Admin: 0 (12/8/2023  7:12 AM)  Julieta Score: 9 (12/8/2023  8:49 AM)

## 2023-12-12 ENCOUNTER — TELEPHONE (OUTPATIENT)
Dept: OBSTETRICS AND GYNECOLOGY | Facility: CLINIC | Age: 48
End: 2023-12-12
Payer: COMMERCIAL

## 2023-12-12 NOTE — TELEPHONE ENCOUNTER
I attempted to contact patient. O answer, LVM.    ARTURO Rainey    ----- Message from Isabela Casey MA sent at 12/11/2023  4:53 PM CST -----  Regarding: FW: self  This pt wants you to call her back when you have a chance.     Isabela  ----- Message -----  From: Nikki Turner  Sent: 12/11/2023  10:24 AM CST  To: [facility] Staff  Subject: self                                             Who called: self        What is the request in detail: pt stated she would like call back from nurse in regards to letter for work or she need appt to be seen for f/u       Can the clinic reply by [facility]? No        Would the patient rather a call back or a response via [facility]? Call back        Best call back number:[phone]

## 2023-12-15 LAB
FINAL PATHOLOGIC DIAGNOSIS: NORMAL
GROSS: NORMAL
Lab: NORMAL

## 2023-12-19 ENCOUNTER — PATIENT MESSAGE (OUTPATIENT)
Dept: OBSTETRICS AND GYNECOLOGY | Facility: CLINIC | Age: 48
End: 2023-12-19
Payer: COMMERCIAL

## 2023-12-19 NOTE — TELEPHONE ENCOUNTER
No care due was identified.  [facility] Embedded Care Due Messages. Reference number: [id number].   12/19/2023 7:49:06 AM CST

## 2023-12-20 RX ORDER — OXYCODONE HYDROCHLORIDE 5 MG/1
5 TABLET ORAL EVERY 4 HOURS PRN
Qty: 30 TABLET | Refills: 0 | OUTPATIENT
Start: 2023-12-20

## 2023-12-20 RX ORDER — FLUTICASONE PROPIONATE 50 MCG
SPRAY, SUSPENSION (ML) NASAL
Qty: 16 G | Refills: 0 | Status: SHIPPED | OUTPATIENT
Start: 2023-12-20 | End: 2024-01-24 | Stop reason: SDUPTHER

## 2023-12-21 ENCOUNTER — TELEPHONE (OUTPATIENT)
Dept: OBSTETRICS AND GYNECOLOGY | Facility: CLINIC | Age: 48
End: 2023-12-21
Payer: COMMERCIAL

## 2023-12-21 NOTE — TELEPHONE ENCOUNTER
Updated letter sent to pt via portal.     ----- Message from Evens Oliveros III, MD sent at 12/20/2023  3:54 PM CST -----  Regarding: RE: Patient call back  PT coming in for post-op visit this Friday -- you can send her a letter allowing for light duty until then for the next 2 days.     ----- Message -----  From: Kenisha Olmos LPN  Sent: 12/19/2023   5:08 PM CST  To: Evens Oliveros III, MD  Subject: FW: Patient call back                            Please advise if ok to generate letter.     ----- Message -----  From: Catarina Manriquez  Sent: 12/19/2023  12:10 PM CST  To: [facility] Staff  Subject: Patient call back                                .Type: Patient Call Back    Who called:self     What is the request in detail:states she needs her letter for light duty to be extended for at least another week; states she is not able to do the lifting that is required at her employment     Can the clinic reply by [facility]?yes     Would the patient rather a call back or a response via [facility]? Either     Best call back number:.[phone]      Additional Information:

## 2023-12-22 ENCOUNTER — OFFICE VISIT (OUTPATIENT)
Dept: OBSTETRICS AND GYNECOLOGY | Facility: CLINIC | Age: 48
End: 2023-12-22
Payer: MEDICAID

## 2023-12-22 VITALS
SYSTOLIC BLOOD PRESSURE: 159 MMHG | WEIGHT: 166.69 LBS | DIASTOLIC BLOOD PRESSURE: 86 MMHG | HEIGHT: 64 IN | BODY MASS INDEX: 28.46 KG/M2

## 2023-12-22 DIAGNOSIS — Z98.890 STATUS POST LAPAROSCOPIC SURGERY: Primary | ICD-10-CM

## 2023-12-22 PROCEDURE — 3008F PR BODY MASS INDEX (BMI) DOCUMENTED: ICD-10-PCS | Mod: CPTII,,, | Performed by: STUDENT IN AN ORGANIZED HEALTH CARE EDUCATION/TRAINING PROGRAM

## 2023-12-22 PROCEDURE — 1159F PR MEDICATION LIST DOCUMENTED IN MEDICAL RECORD: ICD-10-PCS | Mod: CPTII,,, | Performed by: STUDENT IN AN ORGANIZED HEALTH CARE EDUCATION/TRAINING PROGRAM

## 2023-12-22 PROCEDURE — 3079F DIAST BP 80-89 MM HG: CPT | Mod: CPTII,,, | Performed by: STUDENT IN AN ORGANIZED HEALTH CARE EDUCATION/TRAINING PROGRAM

## 2023-12-22 PROCEDURE — 3008F BODY MASS INDEX DOCD: CPT | Mod: CPTII,,, | Performed by: STUDENT IN AN ORGANIZED HEALTH CARE EDUCATION/TRAINING PROGRAM

## 2023-12-22 PROCEDURE — 99213 OFFICE O/P EST LOW 20 MIN: CPT | Mod: PBBFAC | Performed by: STUDENT IN AN ORGANIZED HEALTH CARE EDUCATION/TRAINING PROGRAM

## 2023-12-22 PROCEDURE — 3077F PR MOST RECENT SYSTOLIC BLOOD PRESSURE >= 140 MM HG: ICD-10-PCS | Mod: CPTII,,, | Performed by: STUDENT IN AN ORGANIZED HEALTH CARE EDUCATION/TRAINING PROGRAM

## 2023-12-22 PROCEDURE — 1159F MED LIST DOCD IN RCRD: CPT | Mod: CPTII,,, | Performed by: STUDENT IN AN ORGANIZED HEALTH CARE EDUCATION/TRAINING PROGRAM

## 2023-12-22 PROCEDURE — 99212 PR OFFICE/OUTPT VISIT, EST, LEVL II, 10-19 MIN: ICD-10-PCS | Mod: S$PBB,,, | Performed by: STUDENT IN AN ORGANIZED HEALTH CARE EDUCATION/TRAINING PROGRAM

## 2023-12-22 PROCEDURE — 1160F PR REVIEW ALL MEDS BY PRESCRIBER/CLIN PHARMACIST DOCUMENTED: ICD-10-PCS | Mod: CPTII,,, | Performed by: STUDENT IN AN ORGANIZED HEALTH CARE EDUCATION/TRAINING PROGRAM

## 2023-12-22 PROCEDURE — 3079F PR MOST RECENT DIASTOLIC BLOOD PRESSURE 80-89 MM HG: ICD-10-PCS | Mod: CPTII,,, | Performed by: STUDENT IN AN ORGANIZED HEALTH CARE EDUCATION/TRAINING PROGRAM

## 2023-12-22 PROCEDURE — 3077F SYST BP >= 140 MM HG: CPT | Mod: CPTII,,, | Performed by: STUDENT IN AN ORGANIZED HEALTH CARE EDUCATION/TRAINING PROGRAM

## 2023-12-22 PROCEDURE — 99999 PR PBB SHADOW E&M-EST. PATIENT-LVL III: ICD-10-PCS | Mod: PBBFAC,,, | Performed by: STUDENT IN AN ORGANIZED HEALTH CARE EDUCATION/TRAINING PROGRAM

## 2023-12-22 PROCEDURE — 1160F RVW MEDS BY RX/DR IN RCRD: CPT | Mod: CPTII,,, | Performed by: STUDENT IN AN ORGANIZED HEALTH CARE EDUCATION/TRAINING PROGRAM

## 2023-12-22 PROCEDURE — 99999 PR PBB SHADOW E&M-EST. PATIENT-LVL III: CPT | Mod: PBBFAC,,, | Performed by: STUDENT IN AN ORGANIZED HEALTH CARE EDUCATION/TRAINING PROGRAM

## 2023-12-22 PROCEDURE — 99212 OFFICE O/P EST SF 10 MIN: CPT | Mod: S$PBB,,, | Performed by: STUDENT IN AN ORGANIZED HEALTH CARE EDUCATION/TRAINING PROGRAM

## 2023-12-22 NOTE — PROGRESS NOTES
"Subjective:       Karey Palmer is a 48 y.o. female who presents to the clinic 2 weeks status post laparoscopic tubal ligation for requested sterilization. Eating a regular diet without difficulty. Bowel movements are normal. Pain is controlled without any medications. 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    Review of Systems  Pertinent items are noted in HPI.      Objective:      BP (!) 159/86 (BP Location: Right arm, Patient Position: Sitting)   Ht 5' 4" (1.626 m)   Wt 75.6 kg (166 lb 10.7 oz)   LMP 10/23/2023 (Approximate) Comment: NOT HAVING IN MONTHS SINCE THE PATCH  BMI 28.61 kg/m²   General:  alert, cooperative, and no distress   Abdomen: soft, non-tender   Incision:   healing well, no drainage, no erythema, no hernia, no seroma, no swelling, no dehiscence, incision well approximated       Assessment:      Doing well postoperatively.  Operative findings again reviewed. Pathology report discussed.      Plan:      1. Continue any current medications. Counseled that d/c birth control may result in heavier periods  2. Wound care discussed.  3. Activity restrictions:  light lifting at work for 1 more week  4. Anticipated return to work: not applicable.     Follow up for [employer] next year     Evens Oliveros III, MD  [facility]   [address]  [place] [id number] [phone]     "

## 2024-01-04 NOTE — PROGRESS NOTES
HISTORY OF PRESENT ILLNESS:    Karey Palmer is a 48 y.o. female, , No LMP recorded. (Menstrual status: Birth Control).,  presents for a routine exam. She uses BTL for contraception. She does want STD screening.  No GYN complaints.    The patient participates in regular exercise: Yes.  The patient does not smoke.  The patient wears seatbelts.   Pt denies any domestic violence. Yes -  tattoos or blood transfusions    SCREENING:   Pap: none on file  Mammogram:  , TC Score: 13.74 %   Colonoscopy: N/A   DEXA:  N/A     GYN FH:  Family History   Problem Relation Age of Onset    Pancreatic cancer Mother     Breast cancer Paternal Grandmother     Breast cancer Maternal Aunt     Breast cancer Cousin 30        Unsure of testing    Breast cancer Other        Past Medical History:   Diagnosis Date    Allergy     Essential (primary) hypertension        Past Surgical History:   Procedure Laterality Date    COLONOSCOPY N/A 2023    Procedure: COLONOSCOPY;  Surgeon: Suzanne Murray MD;  Location: [facility] ENDO;  Service: Endoscopy;  Laterality: N/A;   instructions emailed to [email or web address]    LAPAROSCOPIC SALPINGECTOMY Bilateral 2023    Procedure: SALPINGECTOMY, LAPAROSCOPIC;  Surgeon: Evens Oliveros III, MD;  Location: [facility] OR;  Service: OB/GYN;  Laterality: Bilateral;  RN PREOP 2023----- TYPE&SCREEN---done---- UPT--NEG ON  2023@9:50  INCOMPLETE CONSENT AND H/P       MEDICATIONS AND ALLERGIES:      Current Outpatient Medications:     acetaminophen (TYLENOL) 650 MG TbSR, Take 1 tablet (650 mg total) by mouth every 8 (eight) hours., Disp: 30 tablet, Rfl: 1    fluticasone propionate (FLONASE) 50 mcg/actuation nasal spray, SHAKE LIQUID AND USE 1 SPRAY (50 MCG) IN EACH NOSTRIL EVERY DAY, Disp: 16 g, Rfl: 0    GARLIC ORAL, Take by mouth., Disp: , Rfl:     ibuprofen (ADVIL,MOTRIN) 600 MG tablet, Take 1 tablet (600 mg total) by mouth every 6 (six) hours as needed for Pain., Disp: 30 tablet,  Rfl: 1    oxyCODONE (ROXICODONE) 5 MG immediate release tablet, Take 1 to 2 tablets by mouth every 4 to 6 hours as needed for pain)., Disp: 30 tablet, Rfl: 0  No current facility-administered medications for this visit.    Facility-Administered Medications Ordered in Other Visits:     0.9%  NaCl infusion, , Intravenous, Continuous, Evens Oliveros III, MD    mupirocin 2 % ointment, , Nasal, On Call Procedure, Evens Oliveros III, MD, Given at 12/08/23 0630    Review of patient's allergies indicates:  No Known Allergies    Social History     Socioeconomic History    Marital status: Single   Tobacco Use    Smoking status: Never    Smokeless tobacco: Never   Substance and Sexual Activity    Alcohol use: No    Drug use: No    Sexual activity: Yes     Partners: Male     Birth control/protection: Patch   Social History Narrative    Drives [employer]       COMPREHENSIVE GYN HISTORY:    PAP History: Denies abnormal Paps.  Infection History: Denies STDs. Denies PID.  Benign History: Denies uterine fibroids. Denies ovarian cysts. Denies endometriosis. Denies other conditions.  Cancer History: Denies cervical cancer. Denies uterine cancer or hyperplasia. Denies ovarian cancer. Denies vulvar cancer or pre-cancer. Denies vaginal cancer or pre-cancer. Denies breast cancer. Denies colon cancer.  Sexual Activity History: Reports currently being sexually active  Menstrual History: Monthly, mild-moderate.  Contraception:bilateral tubal ligation    ROS:  GENERAL: No weight changes. No swelling. No fatigue. No fever.  CARDIOVASCULAR: No chest pain. No shortness of breath. No leg cramps.   NEUROLOGICAL: No headaches. No vision changes.  BREASTS: No pain. No lumps. No discharge.  ABDOMEN: No pain. No nausea. No vomiting. No diarrhea. No constipation.  REPRODUCTIVE: No abnormal bleeding.   VULVA: No pain. No lesions. No itching.  VAGINA: No relaxation. No itching. No odor. No discharge. No lesions.  URINARY: No incontinence. No nocturia. No  frequency. No dysuria.    BP (!) 136/90   Wt 74 kg (163 lb 2.3 oz)   BMI 28.00 kg/m²     PE:  APPEARANCE: Well nourished, well developed, in no acute distress.  AFFECT: WNL, alert and oriented x 3.  SKIN: No acne or hirsutism.  NECK: Neck symmetric, without masses or thyromegaly.  NODES: No inguinal, cervical, axillary or femoral lymph node enlargement.  CHEST: Good respiratory effort.   ABDOMEN: Soft. No tenderness or masses. No hepatosplenomegaly. No hernias.  BREASTS: Symmetrical, no skin changes, visible lesions, palpable masses or nipple discharge bilaterally.  PELVIC: External female genitalia without lesions.  Female hair distribution. Adequate perineal body, Normal urethral meatus. Vagina moist and well rugated without lesions or discharge.  No significant cystocele or rectocele present. Cervix pink without lesions, discharge or tenderness. Uterus is normal size, regular, mobile and nontender. Adnexa without masses or tenderness.  EXTREMITIES: No edema      DIAGNOSIS:  1. Women's annual routine gynecological examination        2. Screening breast examination        3. Encounter for Papanicolaou smear for cervical cancer screening  Liquid-Based Pap Smear, Screening      4. Screening for human papillomavirus (HPV)  HPV High Risk Genotypes, PCR      5. Encounter for screening mammogram for breast cancer        6. Screening examination for STD (sexually transmitted disease)  Hepatitis B Surface Antigen    Hepatitis C Antibody    Vaginosis Screen by DNA Probe    C. trachomatis/N. gonorrhoeae by AMP DNA      7. Family history of breast cancer  Ambulatory referral/consult to Genetics      8. Family history of pancreatic cancer  Ambulatory referral/consult to Genetics          PLAN:  - Pap and HPV done today.  - Screening tests as ordered.  - Diet and exercise encouraged.  Condom use encouraged for STD prevention.  Seat belt use encouraged.    Counseling: indications for and frequency of periodic gynecologic  exam    FOLLOW-UP with me annually.   Brittaney Rodriguez PA-C

## 2024-01-08 ENCOUNTER — PATIENT MESSAGE (OUTPATIENT)
Dept: HEMATOLOGY/ONCOLOGY | Facility: CLINIC | Age: 49
End: 2024-01-08
Payer: COMMERCIAL

## 2024-01-08 ENCOUNTER — OFFICE VISIT (OUTPATIENT)
Dept: OBSTETRICS AND GYNECOLOGY | Facility: CLINIC | Age: 49
End: 2024-01-08
Payer: COMMERCIAL

## 2024-01-08 VITALS — WEIGHT: 163.13 LBS | DIASTOLIC BLOOD PRESSURE: 90 MMHG | SYSTOLIC BLOOD PRESSURE: 136 MMHG | BODY MASS INDEX: 28 KG/M2

## 2024-01-08 DIAGNOSIS — Z01.419 WOMEN'S ANNUAL ROUTINE GYNECOLOGICAL EXAMINATION: Primary | ICD-10-CM

## 2024-01-08 DIAGNOSIS — Z12.31 ENCOUNTER FOR SCREENING MAMMOGRAM FOR BREAST CANCER: ICD-10-CM

## 2024-01-08 DIAGNOSIS — Z12.4 ENCOUNTER FOR PAPANICOLAOU SMEAR FOR CERVICAL CANCER SCREENING: ICD-10-CM

## 2024-01-08 DIAGNOSIS — Z11.3 SCREENING EXAMINATION FOR STD (SEXUALLY TRANSMITTED DISEASE): ICD-10-CM

## 2024-01-08 DIAGNOSIS — Z80.0 FAMILY HISTORY OF PANCREATIC CANCER: ICD-10-CM

## 2024-01-08 DIAGNOSIS — Z12.39 SCREENING BREAST EXAMINATION: ICD-10-CM

## 2024-01-08 DIAGNOSIS — Z11.51 SCREENING FOR HUMAN PAPILLOMAVIRUS (HPV): ICD-10-CM

## 2024-01-08 DIAGNOSIS — Z80.3 FAMILY HISTORY OF BREAST CANCER: ICD-10-CM

## 2024-01-08 PROCEDURE — 3008F BODY MASS INDEX DOCD: CPT | Mod: CPTII,S$GLB,, | Performed by: PHYSICIAN ASSISTANT

## 2024-01-08 PROCEDURE — 87624 HPV HI-RISK TYP POOLED RSLT: CPT | Performed by: PHYSICIAN ASSISTANT

## 2024-01-08 PROCEDURE — 81514 NFCT DS BV&VAGINITIS DNA ALG: CPT | Performed by: PHYSICIAN ASSISTANT

## 2024-01-08 PROCEDURE — 88175 CYTOPATH C/V AUTO FLUID REDO: CPT | Performed by: PHYSICIAN ASSISTANT

## 2024-01-08 PROCEDURE — 87491 CHLMYD TRACH DNA AMP PROBE: CPT | Performed by: PHYSICIAN ASSISTANT

## 2024-01-08 PROCEDURE — 3075F SYST BP GE 130 - 139MM HG: CPT | Mod: CPTII,S$GLB,, | Performed by: PHYSICIAN ASSISTANT

## 2024-01-08 PROCEDURE — 1159F MED LIST DOCD IN RCRD: CPT | Mod: CPTII,S$GLB,, | Performed by: PHYSICIAN ASSISTANT

## 2024-01-08 PROCEDURE — 1160F RVW MEDS BY RX/DR IN RCRD: CPT | Mod: CPTII,S$GLB,, | Performed by: PHYSICIAN ASSISTANT

## 2024-01-08 PROCEDURE — 3080F DIAST BP >= 90 MM HG: CPT | Mod: CPTII,S$GLB,, | Performed by: PHYSICIAN ASSISTANT

## 2024-01-08 PROCEDURE — 99396 PREV VISIT EST AGE 40-64: CPT | Mod: S$GLB,,, | Performed by: PHYSICIAN ASSISTANT

## 2024-01-08 PROCEDURE — 99999 PR PBB SHADOW E&M-EST. PATIENT-LVL III: CPT | Mod: PBBFAC,,, | Performed by: PHYSICIAN ASSISTANT

## 2024-01-10 LAB
BACTERIAL VAGINOSIS DNA: NEGATIVE
C TRACH DNA SPEC QL NAA+PROBE: NOT DETECTED
CANDIDA GLABRATA DNA: NEGATIVE
CANDIDA KRUSEI DNA: NEGATIVE
CANDIDA RRNA VAG QL PROBE: NEGATIVE
N GONORRHOEA DNA SPEC QL NAA+PROBE: NOT DETECTED
T VAGINALIS RRNA GENITAL QL PROBE: NEGATIVE

## 2024-01-23 ENCOUNTER — HOSPITAL ENCOUNTER (OUTPATIENT)
Dept: RADIOLOGY | Facility: HOSPITAL | Age: 49
Discharge: HOME | End: 2024-01-23
Attending: INTERNAL MEDICINE
Payer: COMMERCIAL

## 2024-01-23 DIAGNOSIS — Z12.31 OTHER SCREENING MAMMOGRAM: ICD-10-CM

## 2024-01-23 PROCEDURE — 77063 BREAST TOMOSYNTHESIS BI: CPT | Mod: 26,,, | Performed by: RADIOLOGY

## 2024-01-23 PROCEDURE — 77067 SCR MAMMO BI INCL CAD: CPT | Mod: 26,,, | Performed by: RADIOLOGY

## 2024-01-23 PROCEDURE — 77067 SCR MAMMO BI INCL CAD: CPT | Mod: TC

## 2024-01-24 RX ORDER — FLUTICASONE PROPIONATE 50 MCG
SPRAY, SUSPENSION (ML) NASAL
Qty: 16 G | Refills: 0 | Status: SHIPPED | OUTPATIENT
Start: 2024-01-24 | End: 2024-03-04

## 2024-01-24 NOTE — TELEPHONE ENCOUNTER
No care due was identified.  [facility] Embedded Care Due Messages. Reference number: [id number].   1/24/2024 12:33:35 PM CST

## 2024-02-21 ENCOUNTER — TELEPHONE (OUTPATIENT)
Dept: HEMATOLOGY/ONCOLOGY | Facility: CLINIC | Age: 49
End: 2024-02-21
Payer: MEDICAID

## 2024-02-21 NOTE — TELEPHONE ENCOUNTER
Called patient to see if she was logging on for 9am appointment today with SANDRINE Giles. Left patient message.

## 2024-02-22 ENCOUNTER — TELEPHONE (OUTPATIENT)
Dept: FAMILY MEDICINE | Facility: CLINIC | Age: 49
End: 2024-02-22
Payer: MEDICAID

## 2024-02-22 NOTE — TELEPHONE ENCOUNTER
Patient presents for a screening [place]oux Tuberculin Skin Test    ----- Message from Margarette Navarro MD sent at 2/22/2024  1:01 PM CST -----  Regarding: coming earlier  Possible to ask Karey if she may come at 10 or 10.30 instead of 11.30? I have a training session at 11.45 and cannot be late.     You can keep her on the schedule for 11.30, please just let me know if she is fine with this. Thanks!

## 2024-02-22 NOTE — TELEPHONE ENCOUNTER
Patient stated coming in at 10:00 am would be perfect for her.  Will come in at 10:00 am. Dr. Navarro advised.

## 2024-02-23 ENCOUNTER — OFFICE VISIT (OUTPATIENT)
Dept: FAMILY MEDICINE | Facility: CLINIC | Age: 49
End: 2024-02-23
Payer: MEDICAID

## 2024-02-23 VITALS
BODY MASS INDEX: 28.15 KG/M2 | RESPIRATION RATE: 16 BRPM | TEMPERATURE: 98 F | HEIGHT: 64 IN | HEART RATE: 75 BPM | SYSTOLIC BLOOD PRESSURE: 140 MMHG | DIASTOLIC BLOOD PRESSURE: 80 MMHG | OXYGEN SATURATION: 99 % | WEIGHT: 164.88 LBS

## 2024-02-23 DIAGNOSIS — Z00.00 ANNUAL PHYSICAL EXAM: Primary | ICD-10-CM

## 2024-02-23 DIAGNOSIS — J30.1 SEASONAL ALLERGIC RHINITIS DUE TO POLLEN: ICD-10-CM

## 2024-02-23 DIAGNOSIS — I10 HYPERTENSION, ESSENTIAL, BENIGN: ICD-10-CM

## 2024-02-23 PROCEDURE — 3008F BODY MASS INDEX DOCD: CPT | Mod: CPTII,,, | Performed by: INTERNAL MEDICINE

## 2024-02-23 PROCEDURE — 99396 PREV VISIT EST AGE 40-64: CPT | Mod: S$PBB,,, | Performed by: INTERNAL MEDICINE

## 2024-02-23 PROCEDURE — 1160F RVW MEDS BY RX/DR IN RCRD: CPT | Mod: CPTII,,, | Performed by: INTERNAL MEDICINE

## 2024-02-23 PROCEDURE — 3077F SYST BP >= 140 MM HG: CPT | Mod: CPTII,,, | Performed by: INTERNAL MEDICINE

## 2024-02-23 PROCEDURE — 99213 OFFICE O/P EST LOW 20 MIN: CPT | Mod: PBBFAC,PO | Performed by: INTERNAL MEDICINE

## 2024-02-23 PROCEDURE — 99999 PR PBB SHADOW E&M-EST. PATIENT-LVL III: CPT | Mod: PBBFAC,,, | Performed by: INTERNAL MEDICINE

## 2024-02-23 PROCEDURE — 1159F MED LIST DOCD IN RCRD: CPT | Mod: CPTII,,, | Performed by: INTERNAL MEDICINE

## 2024-02-23 PROCEDURE — 3079F DIAST BP 80-89 MM HG: CPT | Mod: CPTII,,, | Performed by: INTERNAL MEDICINE

## 2024-02-23 RX ORDER — CETIRIZINE HYDROCHLORIDE 10 MG/1
10 TABLET ORAL DAILY
Qty: 30 TABLET | Refills: 2 | Status: SHIPPED | OUTPATIENT
Start: 2024-02-23 | End: 2025-02-22

## 2024-02-23 RX ORDER — AZELASTINE 1 MG/ML
1 SPRAY, METERED NASAL DAILY PRN
Qty: 30 ML | Refills: 0 | Status: SHIPPED | OUTPATIENT
Start: 2024-02-23 | End: 2025-02-22

## 2024-02-23 NOTE — PROGRESS NOTES
Subjective:       Patient ID: Karey Palmer is a pleasant 48 y.o. Black or  female patient    Chief Complaint: Annual Exam      Patient is a pt I saw last on  03/21/2022, see my last notes.    HPI     Pt with PMH as per list of problems below coming today for her annual physical exam  From our last visit:  - Ob-Gyn  - laparoscopic salpingectomy for requested sterilization  She reports feeling fine except allergic rhinitis.    One of her colleagues in [employer] advised for her to ask to me about Zyrtec.  She has been using Flonase and saline nasal spray, still having issues, season is bad with a lot of pollens.    No fever, no CP, no SOB, no cough.      Patient Active Problem List   Diagnosis    Encounter for female sterilization procedure    Essential hypertension          ACTIVE MEDICAL ISSUES:  Documented in Problem List     PAST MEDICAL HISTORY  Documented     PAST SURGICAL HISTORY:  Documented     SOCIAL HISTORY:  Documented     FAMILY HISTORY:  Documented     ALLERGIES AND MEDICATIONS: updated and reviewed.  Documented    Review of Systems   Constitutional:  Negative for activity change, appetite change, fatigue and fever.   HENT:  Positive for postnasal drip and rhinorrhea. Negative for congestion, sinus pressure and sore throat (scratchy throat).    Eyes:  Positive for itching. Negative for pain, discharge and redness.   Respiratory:  Negative for cough, chest tightness and shortness of breath.    Cardiovascular:  Negative for chest pain, palpitations and leg swelling.   Gastrointestinal:  Negative for abdominal pain, constipation and nausea.   Endocrine: Negative for cold intolerance and heat intolerance.   Genitourinary:  Negative for difficulty urinating, flank pain, frequency, menstrual problem, pelvic pain, urgency and vaginal discharge.   Musculoskeletal:  Negative for arthralgias, back pain, joint swelling and neck pain.   Skin:  Negative for color change and rash.  "  Allergic/Immunologic: Negative for environmental allergies and food allergies.   Neurological:  Negative for weakness, numbness and headaches.   Hematological:  Negative for adenopathy.   Psychiatric/Behavioral:  Negative for behavioral problems, hallucinations, sleep disturbance and suicidal ideas. The patient is not nervous/anxious.    All other systems reviewed and are negative.      Objective:      Physical Exam  Constitutional:       Appearance: Normal appearance.   HENT:      Head: Normocephalic and atraumatic.      Right Ear: Tympanic membrane normal.      Left Ear: Tympanic membrane normal.      Mouth/Throat:      Pharynx: Posterior oropharyngeal erythema present.   Eyes:      Conjunctiva/sclera: Conjunctivae normal.   Cardiovascular:      Rate and Rhythm: Normal rate and regular rhythm.      Pulses: Normal pulses.      Heart sounds: Normal heart sounds.   Pulmonary:      Effort: Pulmonary effort is normal.      Breath sounds: Normal breath sounds.   Neurological:      General: No focal deficit present.      Mental Status: She is alert and oriented to person, place, and time.   Psychiatric:         Mood and Affect: Mood normal.         Behavior: Behavior normal.         Vitals:    02/23/24 0951   BP: (!) 140/80   BP Location: Left arm   Patient Position: Sitting   BP Method: Medium (Manual)   Pulse: 75   Resp: 16   Temp: 98.1 °F (36.7 °C)   TempSrc: Oral   SpO2: 99%   Weight: 74.8 kg (164 lb 14.5 oz)   Height: 5' 4" (1.626 m)     Body mass index is 28.31 kg/m².    RESULTS: Reviewed labs from last 12 months    Last Lab Results:     Lab Results   Component Value Date    WBC 6.61 11/28/2023    HGB 12.4 11/28/2023    HCT 38.1 11/28/2023     11/28/2023     11/28/2023    K 4.2 11/28/2023     11/28/2023    CO2 25 11/28/2023    BUN 7 11/28/2023    CREATININE 0.8 11/28/2023    CALCIUM 9.0 11/28/2023    ALBUMIN 3.5 11/28/2023    AST 15 11/28/2023    ALT 14 11/28/2023    CHOL 196 03/21/2022    TRIG " 46 03/21/2022    HDL 75 03/21/2022    LDLCALC 111.8 03/21/2022    HGBA1C 4.9 03/21/2022    TSH 0.857 03/21/2022       Assessment:       1. Annual physical exam    2. Hypertension, essential, benign    3. Seasonal allergic rhinitis due to pollen        Plan:   Karey was seen today for annual exam.    Diagnoses and all orders for this visit:    Annual physical exam    Reviewed blood work done in 12/2023 with pt. Discussed and updated preventative measures and lifestyle.    Hypertension, essential, benign    BP above goal, discussed lifestyle and salt intake, will check BP at her place in conditions as per [facility] and send me her log in one week.    Seasonal allergic rhinitis due to pollen  -     cetirizine (ZYRTEC) 10 MG tablet; Take 1 tablet (10 mg total) by mouth once daily.  -     azelastine (ASTELIN) 137 mcg (0.1 %) nasal spray; 1 spray (137 mcg total) by Nasal route daily as needed for Rhinitis.    Advised to take antihistaminic daily for now, can go on with Flonase and saline nasal spray, will add Astelin spray.    No follow-ups on file.    This note was created by combination of typed  and M-Modal dictation.  Transcription errors may be present.  If there are any questions, please contact me.

## 2024-03-01 ENCOUNTER — PATIENT MESSAGE (OUTPATIENT)
Dept: ADMINISTRATIVE | Facility: HOSPITAL | Age: 49
End: 2024-03-01
Payer: MEDICAID

## 2024-03-04 RX ORDER — FLUTICASONE PROPIONATE 50 MCG
SPRAY, SUSPENSION (ML) NASAL
Qty: 32 G | Refills: 3 | Status: SHIPPED | OUTPATIENT
Start: 2024-03-04

## 2024-03-04 NOTE — TELEPHONE ENCOUNTER
No care due was identified.  [facility] Embedded Care Due Messages. Reference number: [id number].   3/04/2024 10:09:31 AM CST

## 2024-03-04 NOTE — TELEPHONE ENCOUNTER
Refill Decision Note   Karey Spencer  is requesting a refill authorization.  Brief Assessment and Rationale for Refill:  Approve     Medication Therapy Plan:         Comments:     Note composed:4:49 PM 03/04/2024

## 2024-03-15 ENCOUNTER — PATIENT MESSAGE (OUTPATIENT)
Dept: FAMILY MEDICINE | Facility: CLINIC | Age: 49
End: 2024-03-15
Payer: MEDICAID

## 2024-03-15 ENCOUNTER — TELEPHONE (OUTPATIENT)
Dept: FAMILY MEDICINE | Facility: CLINIC | Age: 49
End: 2024-03-15
Payer: MEDICAID

## 2024-03-15 DIAGNOSIS — J02.9 SORE THROAT: Primary | ICD-10-CM

## 2024-03-15 NOTE — TELEPHONE ENCOUNTER
Patient requested referral to ENT due to sore throat, maybe related to allergies but unsure there was nothing else.

## 2024-03-15 NOTE — TELEPHONE ENCOUNTER
Pt states she wants to be sure it's just allergies and nothing more serious; states she has hoarseness, scratchy throat and burning at times; would like to see ENT; referral pended

## 2024-03-15 NOTE — TELEPHONE ENCOUNTER
LM for pt to call office with reason for referral and if specific provider she is wanting to see; also sent [employer] message

## 2024-03-15 NOTE — TELEPHONE ENCOUNTER
----- Message from Demetrius Barros sent at 3/15/2024  2:59 PM CDT -----  Regarding: Karey  Type:Patient Callback     Who called: Karey     What is the request in detail: Patient is requesting a referral for ENT. Please reach out to the patient.     Can the clinic reply by [facility]? Yes     Would the patient rather a call back or a response via [facility]? Callback     Best call back number: [phone]      Additional Information:

## 2024-03-15 NOTE — TELEPHONE ENCOUNTER
----- Message from Evangelina Rosenbaum sent at 3/15/2024  3:49 PM CDT -----  Regarding: Return Call  .Type:  Patient Returning Call    Who Called:  Self     Who Left Message for Patient: PATRICK    Does the patient know what this is regarding?: yes, she is having problems with her throat      Would the patient rather a call back or a response via [facility]? Call     Best Call Back Number: [phone]

## 2024-03-16 ENCOUNTER — HOSPITAL ENCOUNTER (EMERGENCY)
Facility: HOSPITAL | Age: 49
Discharge: HOME | End: 2024-03-16
Attending: EMERGENCY MEDICINE
Payer: MEDICAID

## 2024-03-16 VITALS
OXYGEN SATURATION: 99 % | RESPIRATION RATE: 18 BRPM | HEIGHT: 64 IN | TEMPERATURE: 99 F | DIASTOLIC BLOOD PRESSURE: 99 MMHG | BODY MASS INDEX: 28.17 KG/M2 | WEIGHT: 165 LBS | HEART RATE: 88 BPM | SYSTOLIC BLOOD PRESSURE: 137 MMHG

## 2024-03-16 DIAGNOSIS — J04.0 LARYNGITIS: Primary | ICD-10-CM

## 2024-03-16 LAB
B-HCG UR QL: NEGATIVE
CTP QC/QA: YES
MOLECULAR STREP A: NEGATIVE
POC MOLECULAR INFLUENZA A AGN: NEGATIVE
POC MOLECULAR INFLUENZA B AGN: NEGATIVE
SARS-COV-2 RDRP RESP QL NAA+PROBE: NEGATIVE

## 2024-03-16 PROCEDURE — 96372 THER/PROPH/DIAG INJ SC/IM: CPT | Performed by: NURSE PRACTITIONER

## 2024-03-16 PROCEDURE — 63600175 PHARM REV CODE 636 W HCPCS: Performed by: NURSE PRACTITIONER

## 2024-03-16 PROCEDURE — 87651 STREP A DNA AMP PROBE: CPT

## 2024-03-16 PROCEDURE — 87635 SARS-COV-2 COVID-19 AMP PRB: CPT | Performed by: EMERGENCY MEDICINE

## 2024-03-16 PROCEDURE — 99284 EMERGENCY DEPT VISIT MOD MDM: CPT | Mod: 25

## 2024-03-16 PROCEDURE — 87502 INFLUENZA DNA AMP PROBE: CPT

## 2024-03-16 PROCEDURE — 81025 URINE PREGNANCY TEST: CPT | Performed by: EMERGENCY MEDICINE

## 2024-03-16 RX ORDER — DEXAMETHASONE SODIUM PHOSPHATE 4 MG/ML
8 INJECTION, SOLUTION INTRA-ARTICULAR; INTRALESIONAL; INTRAMUSCULAR; INTRAVENOUS; SOFT TISSUE
Status: COMPLETED | OUTPATIENT
Start: 2024-03-16 | End: 2024-03-16

## 2024-03-16 RX ADMIN — DEXAMETHASONE SODIUM PHOSPHATE 8 MG: 4 INJECTION INTRA-ARTICULAR; INTRALESIONAL; INTRAMUSCULAR; INTRAVENOUS; SOFT TISSUE at 08:03

## 2024-03-16 NOTE — DISCHARGE INSTRUCTIONS
Cepacol Lozenges as directed on package.  Warm fluids and warm saltwater gargles.       Return to the Emergency Department for any worsening, change in condition, or any emergent concerns.

## 2024-03-16 NOTE — ED PROVIDER NOTES
"Encounter Date: 3/16/2024       History     Chief Complaint   Patient presents with    Sore Throat     Pt reports sore throat, cough, and congestion x3 days. Pt states "Im worried about my throat because my cousin has throat cancer". Pt denies taking medications for her symptoms today.     Chief complaint:  Sore throat    History of present illness:  Patient is a 49-year-old female who reports 3 days of voice change chills headache congestion runny nose sore throat and cough.  She is taken NyQuil saline gargles Flonase and allergy pills.  She reports her throat feels "weird".  She reports 8/10 severity of discomfort.  This does follow a upper respiratory infection.  She states that she is especially concerned because 1 of her cousins has throat cancer and she would like to make sure that this is not the problem.  She did call her primary care provider and ask for an ENT referral which that doctor did place.    The history is provided by the patient. No  was used.     Review of patient's allergies indicates:  No Known Allergies  Past Medical History:   Diagnosis Date    Allergy     Essential (primary) hypertension      Past Surgical History:   Procedure Laterality Date    COLONOSCOPY N/A 1/13/2023    Procedure: COLONOSCOPY;  Surgeon: Suzanne Murray MD;  Location: [facility] ENDO;  Service: Endoscopy;  Laterality: N/A;  12/29 instructions emailed to [email or web address]    LAPAROSCOPIC SALPINGECTOMY Bilateral 12/8/2023    Procedure: SALPINGECTOMY, LAPAROSCOPIC;  Surgeon: Evens Oliveros III, MD;  Location: [facility] OR;  Service: OB/GYN;  Laterality: Bilateral;  RN PREOP 11/28/2023----- TYPE&SCREEN---done---- UPT--NEG ON  12/5/2023@9:50  INCOMPLETE CONSENT AND H/P     Family History   Problem Relation Age of Onset    Pancreatic cancer Mother     Breast cancer Paternal Grandmother     Breast cancer Maternal Aunt     Breast cancer Cousin 30        Unsure of testing    Breast cancer Other      Social History "     Tobacco Use    Smoking status: Never    Smokeless tobacco: Never   Substance Use Topics    Alcohol use: No    Drug use: No     Review of Systems   Constitutional:  Positive for chills. Negative for fatigue and fever.   HENT:  Positive for congestion, rhinorrhea, sore throat and voice change. Negative for ear discharge, ear pain, postnasal drip, sinus pressure and sneezing.    Eyes:  Negative for discharge and itching.   Respiratory:  Positive for cough. Negative for shortness of breath and wheezing.    Cardiovascular:  Negative for chest pain, palpitations and leg swelling.   Gastrointestinal:  Negative for abdominal pain, constipation, diarrhea, nausea and vomiting.   Endocrine: Negative for polydipsia, polyphagia and polyuria.   Genitourinary:  Negative for dysuria, frequency, hematuria, urgency, vaginal bleeding, vaginal discharge and vaginal pain.   Musculoskeletal:  Negative for arthralgias and myalgias.   Skin:  Negative for rash and wound.   Neurological:  Positive for headaches. Negative for dizziness, seizures, syncope, weakness and numbness.   Hematological:  Negative for adenopathy. Does not bruise/bleed easily.   Psychiatric/Behavioral:  Negative for self-injury and suicidal ideas. The patient is not nervous/anxious.        Physical Exam     Initial Vitals [03/16/24 0755]   BP Pulse Resp Temp SpO2   (!) 137/99 88 18 98.5 °F (36.9 °C) 99 %      MAP       --         Physical Exam    Nursing note and vitals reviewed.  Constitutional: She appears well-developed and well-nourished.   HENT:   Head: Normocephalic and atraumatic.   Right Ear: Hearing, tympanic membrane, external ear and ear canal normal.   Left Ear: Hearing, tympanic membrane, external ear and ear canal normal.   Nose: Nose normal. No mucosal edema or rhinorrhea. No epistaxis. Right sinus exhibits no maxillary sinus tenderness and no frontal sinus tenderness. Left sinus exhibits no maxillary sinus tenderness and no frontal sinus tenderness.  "  Mouth/Throat: Uvula is midline, oropharynx is clear and moist and mucous membranes are normal. No oral lesions. Normal dentition.   Eyes: Conjunctivae and EOM are normal. Pupils are equal, round, and reactive to light. Right eye exhibits no discharge. Left eye exhibits no discharge.   Neck:   Normal range of motion.  Cardiovascular:  Regular rhythm, S1 normal, S2 normal and normal heart sounds.     Exam reveals no gallop.       No murmur heard.  Pulmonary/Chest: Effort normal and breath sounds normal. No respiratory distress. She has no decreased breath sounds. She has no wheezes. She has no rhonchi. She has no rales.   Abdominal: She exhibits no distension.   Musculoskeletal:         General: Normal range of motion.      Cervical back: Normal range of motion.     Neurological: She is alert and oriented to person, place, and time.   Skin: Skin is dry. Capillary refill takes less than 2 seconds.         ED Course   Procedures  Labs Reviewed   POCT URINE PREGNANCY   SARS-COV-2 RDRP GENE   POCT INFLUENZA A/B MOLECULAR   POCT STREP A MOLECULAR          Imaging Results    None          Medications   dexAMETHasone injection 8 mg (8 mg Intramuscular Given 3/16/24 0834)     Medical Decision Making  Patient is a 49-year-old female who reports 3 days of voice change chills headache congestion runny nose sore throat and cough.  She is taken NyQuil saline gargles Flonase and allergy pills.  She reports her throat feels "weird".  She reports 8/10 severity of discomfort.  This does follow a upper respiratory infection.  She states that she is especially concerned because 1 of her cousins has throat cancer and she would like to make sure that this is not the problem.  She did call her primary care provider and ask for an ENT referral which that doctor did place.    On physical exam the patient's oropharynx is clear of abnormalities.  The uvula is midline there is cobblestoning of the posterior oropharynx.  Ears are without " "abnormality.  Her carotids are palpable but not enlarged.  Airway is patent tolerating secretions.  Voice is hoarse.    Differential diagnosis includes laryngitis viral pharyngitis strep throat.    Problems Addressed:  Laryngitis: acute illness or injury     Details: Provided dexamethasone 8 mg IM in the emergency department.  Patient discharged to [facility] for sore throat and follow up with ENT referral provided.    Amount and/or Complexity of Data Reviewed  Labs: ordered. Decision-making details documented in ED Course.  Discussion of management or test interpretation with external provider(s): Vital signs at the time of disposition were:  BP (!) 137/99 (BP Location: Right arm, Patient Position: Sitting)   Pulse 88   Temp 98.5 °F (36.9 °C) (Oral)   Resp 18   Ht 5' 4" (1.626 m)   Wt 74.8 kg (165 lb)   SpO2 99%   BMI 28.32 kg/m²       See AVS for additional recommendations. Medications listed herein were prescribed after reviewing the patient's allergies, medication list, history, most recent laboratories as available.  Referrals below were provided after reviewing the patient's previous medical providers. She understands she  should return for any worsening or changes in condition.  Prior to discharge the patient was asked if she  had any additional concerns or complaints and she declined. The patient was given an opportunity to ask questions and all were answered to her satisfaction.     Risk  OTC drugs.  Prescription drug management.  Diagnosis or treatment significantly limited by social determinants of health.               ED Course as of 03/16/24 0902   Sat Mar 16, 2024   0814 hCG Qualitative, Urine: Negative [VC]   0814 Molecular Strep A, POC: Negative [VC]   0814 BP(!): 137/99 [VC]   0814 Temp: 98.5 °F (36.9 °C) [VC]   0814 Temp Source: Oral [VC]   0814 Pulse: 88 [VC]   0814 Resp: 18 [VC]   0814 SpO2: 99 % [VC]   0820 SARS-CoV-2 RNA, Amplification, Qual: Negative [VC]   0820 POC Molecular Influenza A " Ag: Negative [VC]   0821 POC Molecular Influenza B Ag: Negative [VC]      ED Course User Index  [VC] Channing Santamaria DNP                           Clinical Impression:  Final diagnoses:  [J04.0] Laryngitis (Primary)          ED Disposition Condition    Discharge Stable          ED Prescriptions    None       Follow-up Information       Follow up With Specialties Details Why Contact Info    Mitzi Diaz MD Otolaryngology Schedule an appointment as soon as possible for a visit   [address] [place] [address]  [phone]               Channing Santamaria DNP  03/16/24 0902

## 2024-06-04 ENCOUNTER — PATIENT MESSAGE (OUTPATIENT)
Dept: ADMINISTRATIVE | Facility: HOSPITAL | Age: 49
End: 2024-06-04
Payer: MEDICAID

## 2024-07-01 ENCOUNTER — PATIENT MESSAGE (OUTPATIENT)
Dept: URGENT CARE | Facility: CLINIC | Age: 49
End: 2024-07-01
Payer: MEDICAID

## 2024-07-01 ENCOUNTER — PATIENT MESSAGE (OUTPATIENT)
Dept: OBSTETRICS AND GYNECOLOGY | Facility: CLINIC | Age: 49
End: 2024-07-01
Payer: MEDICAID

## 2024-07-03 ENCOUNTER — TELEPHONE (OUTPATIENT)
Dept: FAMILY MEDICINE | Facility: CLINIC | Age: 49
End: 2024-07-03
Payer: MEDICAID

## 2024-07-12 ENCOUNTER — OFFICE VISIT (OUTPATIENT)
Dept: FAMILY MEDICINE | Facility: CLINIC | Age: 49
End: 2024-07-12
Payer: MEDICAID

## 2024-07-12 VITALS
DIASTOLIC BLOOD PRESSURE: 100 MMHG | BODY MASS INDEX: 30.11 KG/M2 | SYSTOLIC BLOOD PRESSURE: 140 MMHG | TEMPERATURE: 99 F | RESPIRATION RATE: 18 BRPM | HEIGHT: 64 IN | WEIGHT: 176.38 LBS

## 2024-07-12 DIAGNOSIS — R31.9 HEMATURIA, UNSPECIFIED TYPE: ICD-10-CM

## 2024-07-12 DIAGNOSIS — Z01.00 ROUTINE EYE EXAM: ICD-10-CM

## 2024-07-12 DIAGNOSIS — I10 ESSENTIAL HYPERTENSION: Primary | ICD-10-CM

## 2024-07-12 DIAGNOSIS — Z23 NEED FOR TDAP VACCINATION: ICD-10-CM

## 2024-07-12 PROCEDURE — 90715 TDAP VACCINE 7 YRS/> IM: CPT | Mod: PBBFAC,PO

## 2024-07-12 PROCEDURE — 3077F SYST BP >= 140 MM HG: CPT | Mod: CPTII,,,

## 2024-07-12 PROCEDURE — 3044F HG A1C LEVEL LT 7.0%: CPT | Mod: CPTII,,,

## 2024-07-12 PROCEDURE — 99214 OFFICE O/P EST MOD 30 MIN: CPT | Mod: 25,S$PBB,,

## 2024-07-12 PROCEDURE — 99999 PR PBB SHADOW E&M-EST. PATIENT-LVL V: CPT | Mod: PBBFAC,,,

## 2024-07-12 PROCEDURE — 3080F DIAST BP >= 90 MM HG: CPT | Mod: CPTII,,,

## 2024-07-12 PROCEDURE — 97802 MEDICAL NUTRITION INDIV IN: CPT | Mod: PBBFAC,PO

## 2024-07-12 PROCEDURE — 1160F RVW MEDS BY RX/DR IN RCRD: CPT | Mod: CPTII,,,

## 2024-07-12 PROCEDURE — 1159F MED LIST DOCD IN RCRD: CPT | Mod: CPTII,,,

## 2024-07-12 PROCEDURE — 99999PBSHW PR PBB SHADOW TECHNICAL ONLY FILED TO HB: Mod: PBBFAC,,,

## 2024-07-12 PROCEDURE — 99215 OFFICE O/P EST HI 40 MIN: CPT | Mod: PBBFAC,PO

## 2024-07-12 PROCEDURE — 3061F NEG MICROALBUMINURIA REV: CPT | Mod: CPTII,,,

## 2024-07-12 PROCEDURE — 3008F BODY MASS INDEX DOCD: CPT | Mod: CPTII,,,

## 2024-07-12 PROCEDURE — 90471 IMMUNIZATION ADMIN: CPT | Mod: PBBFAC,PO

## 2024-07-12 PROCEDURE — 3066F NEPHROPATHY DOC TX: CPT | Mod: CPTII,,,

## 2024-07-12 RX ORDER — PANTOPRAZOLE SODIUM 40 MG/1
TABLET, DELAYED RELEASE ORAL
COMMUNITY
Start: 2024-03-19

## 2024-07-12 RX ORDER — COVID-19 ANTIGEN TEST
KIT MISCELLANEOUS
COMMUNITY
Start: 2024-03-14

## 2024-07-12 RX ADMIN — TETANUS TOXOID, REDUCED DIPHTHERIA TOXOID AND ACELLULAR PERTUSSIS VACCINE, ADSORBED 0.5 ML: 5; 2.5; 8; 8; 2.5 SUSPENSION INTRAMUSCULAR at 09:07

## 2024-07-12 NOTE — PROGRESS NOTES
Health Maintenance Due   Topic     TETANUS VACCINE  Pt agree to get today    COVID-19 Vaccine (4 - 2023-24 season) Not offered at this Facility

## 2024-07-12 NOTE — PATIENT INSTRUCTIONS
Health Threats from High Blood Pressure  American [employer] Association   High blood pressure threatens your health and quality of life  In most cases damage from high blood pressure (also known as hypertension) happens over time. If not detected and controlled, high blood pressure can lead to:  Heart attack -- The arteries can become blocked and prevent blood flow to the heart muscle.  Stroke -- Blood vessels that supply blood and oxygen to the brain to become blocked or burst.  Heart failure -- High blood pressure makes the heart work harder. This can cause the heart to get bigger and struggle to pump enough blood to the body.  Kidney disease or failure -- Damage in the arteries around the kidneys can stop them from filtering blood the right way.  Vision loss -- Blood vessels in the eyes can get strained or damaged.  Sexual dysfunction -- High blood pressure can lead to erectile dysfunction (not being able to have or maintain an erection) in men and may cause a lower sex drive in women.   Heart disease -- Over time, high blood pressure can lead to heart disease. Angina is chest pain that is a common symptom.  Atherosclerosis (plaque buildup in the arteries) -- High blood pressure can damage arteries in the body and allow plaque to build up.      Hypertension (who.int)   Prevention  Lifestyle changes can help lower high blood pressure and can help anyone with hypertension. Many who make these changes will still need to take medicine.    These lifestyle changes can help prevent and lower high blood pressure.    Do:  Eat more vegetables and fruits.  Sit less.  Be more physically active, which can include walking, running, swimming, dancing or activities that build strength, like lifting weights.  Get at least 150 minutes per week of moderate-intensity aerobic activity or 75 minutes per week of vigorous aerobic activity.  Do strength building exercises 2 or more days each week.  Lose weight if youre overweight or  obese.  Take medicines as prescribed by your health care professional.  Keep appointments with your health care professional.  Dont:  eat too much salty food (try to stay under 2 grams per day)  eat foods high in saturated or trans fats  smoke or use tobacco  drink too much alcohol (1 drink daily max for women, 2 for men)  miss or share medication.  Reducing hypertension prevents heart attack, stroke and kidney damage, as well as other health problems.  Reduce the risks of hypertension by:  reducing and managing stress  regularly checking blood pressure  treating high blood pressure  managing other medical conditions  reducing exposure to polluted air.  Complications of uncontrolled hypertension  Among other complications, hypertension can cause serious damage to the heart. Excessive pressure can harden arteries, decreasing the flow of blood and oxygen to the heart. This elevated pressure and reduced blood flow can cause:  chest pain, also called angina;  heart attack, which occurs when the blood supply to the heart is blocked and heart muscle cells die from lack of oxygen. The longer the blood flow is blocked, the greater the damage to the heart;  heart failure, which occurs when the heart cannot pump enough blood and oxygen to other vital body organs; and  irregular heart beat which can lead to a sudden death.  Hypertension can also burst or block arteries that supply blood and oxygen to the brain, causing a stroke.  In addition, hypertension can cause kidney damage, leading to kidney failure.

## 2024-07-12 NOTE — PROGRESS NOTES
HPI     Chief Complaint:  Chief Complaint   Patient presents with    Follow-up     [facility] [place] for hematuria     Referral     Ophthalmologist     Back Pain     Radiates to L side        Karey Palmer is a 49 y.o. female with multiple medical diagnoses as listed in the medical history and problem list that presents for Follow up    Hematuria  The current episode started 1 to 4 weeks ago. The problem is unchanged. She describes the hematuria as microscopic (saw on urine test for job screening) hematuria. She is experiencing no pain. She describes her urine color as clear. Irritative symptoms do not include frequency. Associated symptoms include flank pain (right side). Pertinent negatives include no abdominal pain, chills, dysuria, facial swelling, fever, genital pain, hesitancy, inability to urinate, nausea, vomiting or sore throat. There is no history of tobacco use.     Diet: Meat Stephen Zat, weekly fruit and veg brocc and garlic, snacks: candies, chips and cookies  Fast foods  salmon in air fryer  Water: 3-4  16 oz bottles  Exericse: morning sit ups, no cardio  Walks school bus  ETOH: 3 times week wine cooler  Smoke: no    BP     HCTZ was stopped due to side effects of hoarseness     hydroCHLOROthiazide (HYDRODIURIL) 12.5 MG Tab (Discontinued) 30 tablet 0 8/19/2022     Assessment & Plan   Pt dinies wanting med th for BP tday and wants to take garlic  Simp pox pocorn    Water exeri cse    Food and and seasoning Label and if per serving it is less then 150 mg you use it    Total 1500mg salt per day  Read food and sea labels  Problem List Items Addressed This Visit          Cardiac/Vascular    Essential hypertension    Relevant Orders    Comprehensive Metabolic Panel    CBC Auto Differential    Lipid Panel    Hemoglobin A1C    TSH    T4, Free    Microalbumin/Creatinine Ratio, Urine     Other Visit Diagnoses       Need for Tdap vaccination    -  Primary    Relevant Medications    Tdap (BOOSTRIX) vaccine injection 0.5 mL  "   Hematuria, unspecified type        Relevant Orders    Urinalysis              --------------------------------------------      Health Maintenance:  Health Maintenance         Date Due Completion Date    TETANUS VACCINE Never done ---    COVID-19 Vaccine (4 - 2023-24 season) 09/01/2023 1/5/2022    Influenza Vaccine (1) 09/01/2024 1/15/2024    Mammogram 01/23/2025 1/23/2024    Hemoglobin A1c (Diabetic Prevention Screening) 03/21/2025 3/21/2022    Lipid Panel 03/21/2027 3/21/2022    Cervical Cancer Screening 01/08/2029 1/8/2024    Colorectal Cancer Screening 01/13/2033 8/18/2023            Health maintenance reviewed, Tetanus vaccine ordered, and Lipid panel ordered    Follow Up:  Follow up if symptoms worsen or fail to improve.    Exam     Review of Systems:  (as noted above)  Review of Systems   Constitutional:  Negative for chills and fever.   HENT:  Negative for facial swelling and sore throat.    Gastrointestinal:  Negative for abdominal pain, nausea and vomiting.   Genitourinary:  Positive for flank pain (right side) and hematuria. Negative for dysuria, frequency and hesitancy.       Physical Exam:   Physical Exam  Vitals:    07/12/24 0801 07/12/24 0815   BP: (!) 142/100 (!) 140/100   BP Location: Right arm    Patient Position: Sitting    BP Method: Small (Manual)    Resp: 18    Temp: 99.1 °F (37.3 °C)    TempSrc: Oral    Weight: 80 kg (176 lb 5.9 oz)    Height: 5' 4" (1.626 m)       Body mass index is 30.27 kg/m².        History     Past Medical History:  Past Medical History:   Diagnosis Date    Allergy     Essential (primary) hypertension        Past Surgical History:  Past Surgical History:   Procedure Laterality Date    COLONOSCOPY N/A 1/13/2023    Procedure: COLONOSCOPY;  Surgeon: Suzanne Murray MD;  Location: [facility];  Service: Endoscopy;  Laterality: N/A;  12/29 instructions emailed to [email or web address]    LAPAROSCOPIC SALPINGECTOMY Bilateral 12/8/2023    Procedure: SALPINGECTOMY, " LAPAROSCOPIC;  Surgeon: Evens Oliveros III, MD;  [facility];  Service: OB/GYN;  Laterality: Bilateral;  RN PREOP 11/28/2023----- TYPE&SCREEN---done---- UPT--NEG ON  12/5/2023@9:50  INCOMPLETE CONSENT AND H/P       Social History:  Social History     Socioeconomic History    Marital status: Single   Tobacco Use    Smoking status: Never    Smokeless tobacco: Never   Substance and Sexual Activity    Alcohol use: No    Drug use: No    Sexual activity: Yes     Partners: Male     Birth control/protection: Patch   Social History Narrative    Drives [id number]       Family History:  Family History   Problem Relation Name Age of Onset    Pancreatic cancer Mother      Breast cancer Paternal Grandmother      Breast cancer Maternal Aunt      Breast cancer Cousin  30        Unsure of testing    Breast cancer Other paternal great aunt        Allergies and Medications: (updated and reviewed)  Review of patient's allergies indicates:  No Known Allergies  Current Outpatient Medications   Medication Sig Dispense Refill    azelastine (ASTELIN) 137 mcg (0.1 %) nasal spray 1 spray (137 mcg total) by Nasal route daily as needed for Rhinitis. 30 mL 0    cetirizine (ZYRTEC) 10 MG tablet Take 1 tablet (10 mg total) by mouth once daily. 30 tablet 2    COVID-19 AT-HOME TEST Kit use as directed      fluticasone propionate (FLONASE) 50 mcg/actuation nasal spray SHAKE LIQUID AND USE 1 SPRAY(50 MCG) IN EACH NOSTRIL EVERY DAY 32 g 3    GARLIC ORAL Take by mouth.      ibuprofen (ADVIL,MOTRIN) 600 MG tablet Take 1 tablet (600 mg total) by mouth every 6 (six) hours as needed for Pain. 30 tablet 1    pantoprazole (PROTONIX) 40 MG tablet 1 tablet Orally Once a day for 30 days       Current Facility-Administered Medications   Medication Dose Route Frequency Provider Last Rate Last Admin    Tdap (BOOSTRIX) vaccine injection 0.5 mL  0.5 mL Intramuscular 1 time in Clinic/HOD Anita Hernandez PA-C         Facility-Administered Medications  Ordered in Other Visits   Medication Dose Route Frequency Provider Last Rate Last Admin    0.9%  NaCl infusion   Intravenous Continuous Evens Oliveros III, MD        mupirocin 2 % ointment   Nasal On Call Procedure Evens Oliveros III, MD   Given at 12/08/23 0630       Patient Care Team:  Margarette Navarro MD as PCP - General (Family Medicine)  Karol Ponce LPN (Inactive) as Care Coordinator  Phillip Navas MA as Care Coordinator         - The patient is given an After Visit Summary that lists all medications with directions, allergies, education, orders placed during this encounter and follow-up instructions.      - I have reviewed the patient's medical information including past medical, family, and social history sections including the medications and allergies.      - We discussed the patient's current medications.     This note was created by combination of typed  and MModal dictation.  Transcription errors may be present.  If there are any questions, please contact me.       Anita Hernandez PA-C

## 2024-07-15 PROBLEM — Z23 NEED FOR TDAP VACCINATION: Status: ACTIVE | Noted: 2024-07-15

## 2024-07-15 PROBLEM — Z01.00 ROUTINE EYE EXAM: Status: ACTIVE | Noted: 2024-07-15

## 2024-07-15 PROBLEM — R31.9 HEMATURIA: Status: ACTIVE | Noted: 2024-07-15

## 2024-07-15 NOTE — PROGRESS NOTES
HPI     Chief Complaint:  Chief Complaint   Patient presents with    Follow-up     [facility] [place] for hematuria     Referral     Ophthalmologist     Back Pain     Radiates to L side        Karey Palmer is a 49 y.o. female with multiple medical diagnoses as listed in the medical history and problem list that presents for Follow up    Hematuria  The current episode started 1 to 4 weeks ago. The problem is unchanged. She describes the hematuria as microscopic (saw on urine test for job screening) hematuria. She is experiencing no pain. She describes her urine color as clear. Irritative symptoms do not include frequency. Associated symptoms include flank pain (right side). Pertinent negatives include no abdominal pain, chills, dysuria, facial swelling, fever, genital pain, hesitancy, inability to urinate, nausea, vomiting or sore throat. There is no history of tobacco use.    States that she had a urine check at her job screening  and blood present in the results.  Patient will like urine rechecked today      Diet:  eats meat prepared in many ways,  eats fruit weekly,  vegetables more often during the week likes broccoli and garlic, snacks: candies, chips and cookies   Does eat Fast foods,  salmon in air fryer  Water: 3-4  16 oz bottles  Exercise: morning sit ups, no cardio. 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  Patient has been  denying blood pressure medication for a long time. HCTZ was stopped due to side effects of hoarseness     hydroCHLOROthiazide (HYDRODIURIL) 12.5 MG Tab (Discontinued) 30 tablet 0 8/19/2022         Assessment & Plan   Patient  adamantly does not want blood pressure medication today and wants to take garlic supplement and to change her diet.   Upon reviewing patient's chart patient has been change to change her diet  and use garlic supplement for quite some time,  close to 2 years.     Educated patient on the long-term effects of blood pressure and organ damage.     Patient was educated on diet modifications for high blood pressure.  Advised to throw way and stop using Dakota Chachere's and Kylie's as they are primarily salt in just a few other seasonings.  Suggested alternatives such as Napoles salt free seasonings and more fresh herbs and spices along with fresh  onions and bell peppers and other vegetables to use season her meat and different foods.   Showed pictures of different seasonings and hibiscus tea,  to help lower blood pressure, online for patient to take pictures of to grocery shop.      Suggested to drink 4-5  16 oz bottles of water per day.  To exercise at least 3 times a week with some type of exercise that increases her heart rate suggest cardio: walking, running, jogging or structured exercise class for 30 minutes at a time.     Educated patient on how to read food and seasoning labels.  To look for seasonings that have  labels  with 150 mg  or less per serving.     To reduce her total 2000 mg  salt/sodium per day     Information provided on AVS    Problem List Items Addressed This Visit          Ophtho    Routine eye exam    Relevant Orders    Ambulatory referral/consult to Ophthalmology       Cardiac/Vascular    Essential hypertension - Primary    Relevant Orders    Comprehensive Metabolic Panel (Completed)    CBC Auto Differential (Completed)    Lipid Panel (Completed)    Hemoglobin A1C (Completed)    TSH (Completed)    T4, Free (Completed)    Microalbumin/Creatinine Ratio, Urine (Completed)       Renal/    Hematuria    Relevant Orders    Urinalysis (Completed)       ID    Need for Tdap vaccination    Tdap was administered      --------------------------------------------      Health Maintenance:  Health Maintenance         Date Due Completion Date    COVID-19 Vaccine (4 - 2023-24 season) 09/01/2023  1/5/2022    Influenza Vaccine (1) 09/01/2024 1/15/2024    Mammogram 01/23/2025 1/23/2024    Hemoglobin A1c (Diabetic Prevention Screening) 07/12/2027 7/12/2024    Cervical Cancer Screening 01/08/2029 1/8/2024    Lipid Panel 07/12/2029 7/12/2024    Colorectal Cancer Screening 01/13/2033 8/18/2023    TETANUS VACCINE 07/12/2034 7/12/2024            Health maintenance reviewed, Tetanus vaccine ordered, and Lipid panel ordered    Follow Up:  Follow up if symptoms worsen or fail to improve.    Exam     Review of Systems:  (as noted above)  Review of Systems   Constitutional:  Negative for chills and fever.   HENT:  Negative for facial swelling and sore throat.    Eyes:  Negative for visual disturbance.   Respiratory:  Negative for shortness of breath.    Cardiovascular:  Negative for chest pain.   Gastrointestinal:  Negative for abdominal pain, nausea and vomiting.   Genitourinary:  Positive for flank pain (right side) and hematuria. Negative for dysuria, frequency and hesitancy.   Neurological:  Negative for headaches.       Physical Exam:   Physical Exam  Constitutional:       General: She is not in acute distress.     Appearance: Normal appearance. She is not ill-appearing, toxic-appearing or diaphoretic.   HENT:      Head: Normocephalic and atraumatic.   Neck:      Vascular: No carotid bruit.   Cardiovascular:      Rate and Rhythm: Normal rate and regular rhythm.      Pulses: Normal pulses.      Heart sounds: Normal heart sounds.   Pulmonary:      Effort: Pulmonary effort is normal. No respiratory distress.      Breath sounds: Normal breath sounds.   Abdominal:      Palpations: Abdomen is soft.   Musculoskeletal:      Cervical back: Normal range of motion.      Right lower leg: No edema.      Left lower leg: No edema.   Neurological:      Mental Status: She is alert and oriented to person, place, and time.   Psychiatric:         Mood and Affect: Mood normal.       Vitals:    07/12/24 0801 07/12/24 0815   BP: (!)  "142/100 (!) 140/100   BP Location: Right arm    Patient Position: Sitting    BP Method: Small (Manual)    Resp: 18    Temp: 99.1 °F (37.3 °C)    TempSrc: Oral    Weight: 80 kg (176 lb 5.9 oz)    Height: 5' 4" (1.626 m)       Body mass index is 30.27 kg/m².        History     Past Medical History:  Past Medical History:   Diagnosis Date    Allergy     Essential (primary) hypertension        Past Surgical History:  Past Surgical History:   Procedure Laterality Date    COLONOSCOPY N/A 1/13/2023    Procedure: COLONOSCOPY;  Surgeon: Suzanne Murray MD;  Location: [facility];  Service: Endoscopy;  Laterality: N/A;  12/29 instructions emailed to [email or web address]    LAPAROSCOPIC SALPINGECTOMY Bilateral 12/8/2023    Procedure: SALPINGECTOMY, LAPAROSCOPIC;  Surgeon: Evens Oliveros III, MD;  Location: [facility] OR;  Service: OB/GYN;  Laterality: Bilateral;  RN PREOP 11/28/2023----- TYPE&SCREEN---done---- UPT--NEG ON  12/5/2023@9:50  INCOMPLETE CONSENT AND H/P       Social History:  Social History     Socioeconomic History    Marital status: Single   Tobacco Use    Smoking status: Never    Smokeless tobacco: Never   Substance and Sexual Activity    Alcohol use: No    Drug use: No    Sexual activity: Yes     Partners: Male     Birth control/protection: Patch   Social History Narrative    Drives tour [employer]       Family History:  Family History   Problem Relation Name Age of Onset    Pancreatic cancer Mother      Breast cancer Paternal Grandmother      Breast cancer Maternal Aunt      Breast cancer Cousin  30        Unsure of testing    Breast cancer Other paternal great aunt        Allergies and Medications: (updated and reviewed)  Review of patient's allergies indicates:  No Known Allergies  Current Outpatient Medications   Medication Sig Dispense Refill    azelastine (ASTELIN) 137 mcg (0.1 %) nasal spray 1 spray (137 mcg total) by Nasal route daily as needed for Rhinitis. 30 mL 0    cetirizine (ZYRTEC) 10 MG tablet Take 1 " tablet (10 mg total) by mouth once daily. 30 tablet 2    COVID-19 AT-HOME TEST Kit use as directed      fluticasone propionate (FLONASE) 50 mcg/actuation nasal spray SHAKE LIQUID AND USE 1 SPRAY(50 MCG) IN EACH NOSTRIL EVERY DAY 32 g 3    GARLIC ORAL Take by mouth.      ibuprofen (ADVIL,MOTRIN) 600 MG tablet Take 1 tablet (600 mg total) by mouth every 6 (six) hours as needed for Pain. 30 tablet 1    pantoprazole (PROTONIX) 40 MG tablet 1 tablet Orally Once a day for 30 days       No current facility-administered medications for this visit.     Facility-Administered Medications Ordered in Other Visits   Medication Dose Route Frequency Provider Last Rate Last Admin    0.9%  NaCl infusion   Intravenous Continuous Evens Oliveros III, MD        mupirocin 2 % ointment   Nasal On Call Procedure Evens Oliveros III, MD   Given at 12/08/23 0630       Patient Care Team:  Margarette Navarro MD as PCP - General (Family Medicine)  Karol Ponce LPN (Inactive) as Care Coordinator  Phillip Navas MA as Care Coordinator         - The patient is given an After Visit Summary that lists all medications with directions, allergies, education, orders placed during this encounter and follow-up instructions.      - I have reviewed the patient's medical information including past medical, family, and social history sections including the medications and allergies.      - We discussed the patient's current medications.     This note was created by combination of typed  and MModal dictation.  Transcription errors may be present.  If there are any questions, please contact me.       Anita Hernandez PA-C

## 2024-09-27 ENCOUNTER — TELEPHONE (OUTPATIENT)
Dept: OTOLARYNGOLOGY | Facility: CLINIC | Age: 49
End: 2024-09-27
Payer: MEDICAID

## 2024-09-27 NOTE — TELEPHONE ENCOUNTER
----- Message from Jayna Young MA sent at 9/27/2024  9:53 AM CDT -----  Type:  Sooner Appointment Request    Patient is requesting a sooner appointment.  Patient declined first available appointment listed as well as another facility and provider .  Patient will not accept being placed on the waitlist and is requesting a message be sent to doctor.    Name of Caller: Self    When is the first available appointment? February    Symptoms: soreness and hoarseness.. states she has to drink water constantly due to her throat itching ..     Would the patient rather a call back or a response via [facility]? Yes, call     Best Call Back Number:  [phone] (home)

## 2024-10-01 ENCOUNTER — TELEPHONE (OUTPATIENT)
Dept: HEMATOLOGY/ONCOLOGY | Facility: CLINIC | Age: 49
End: 2024-10-01
Payer: MEDICAID

## 2024-10-01 NOTE — TELEPHONE ENCOUNTER
Returned call and spoke with  Ms Palmer. Ms Palmer calling to request a new patient appointment with an Oncology. Ms Palmer  states she is having throat issues and would like to make sure there is no throat cancer. Ms Palmer has an appointment scheduled with ENT on 10/9/2024 to be further evaluated of the sx she voiced having. Informed Ms Palmer, once she has been seen by the ENT physician and further testing/ scans have been done, if at that time her physician feels a referral to Oncology is needed they will place a referral. Ms Palmer voiced verbal understanding.

## 2024-10-01 NOTE — TELEPHONE ENCOUNTER
----- Message from [employer] sent at 9/30/2024 10:23 AM CDT -----  Name of Who is Calling: ELENITA PRESCOTT [[id number]]          What is the request in detail: Pt is requesting a call back to get scheduled as new patient. Pt advised she is having throat issues and would like to make sure there is no throat cancer. Please assist.           Can the clinic reply by [facility]: No          What Number to Call Back if not in [facility]: [phone]

## 2024-10-09 ENCOUNTER — OFFICE VISIT (OUTPATIENT)
Dept: OTOLARYNGOLOGY | Facility: CLINIC | Age: 49
End: 2024-10-09
Payer: MEDICAID

## 2024-10-09 VITALS
SYSTOLIC BLOOD PRESSURE: 166 MMHG | HEART RATE: 72 BPM | DIASTOLIC BLOOD PRESSURE: 104 MMHG | WEIGHT: 175.38 LBS | HEIGHT: 64 IN | BODY MASS INDEX: 29.94 KG/M2 | RESPIRATION RATE: 16 BRPM

## 2024-10-09 DIAGNOSIS — R09.81 NASAL CONGESTION: ICD-10-CM

## 2024-10-09 DIAGNOSIS — J30.89 NON-SEASONAL ALLERGIC RHINITIS, UNSPECIFIED TRIGGER: Primary | ICD-10-CM

## 2024-10-09 DIAGNOSIS — K21.9 LARYNGOPHARYNGEAL REFLUX (LPR): ICD-10-CM

## 2024-10-09 DIAGNOSIS — R49.0 DYSPHONIA: ICD-10-CM

## 2024-10-09 DIAGNOSIS — J34.2 DEVIATED NASAL SEPTUM: ICD-10-CM

## 2024-10-09 PROCEDURE — 1159F MED LIST DOCD IN RCRD: CPT | Mod: CPTII,S$GLB,, | Performed by: NURSE PRACTITIONER

## 2024-10-09 PROCEDURE — 3066F NEPHROPATHY DOC TX: CPT | Mod: CPTII,S$GLB,, | Performed by: NURSE PRACTITIONER

## 2024-10-09 PROCEDURE — 99204 OFFICE O/P NEW MOD 45 MIN: CPT | Mod: 25,S$GLB,, | Performed by: NURSE PRACTITIONER

## 2024-10-09 PROCEDURE — 31575 DIAGNOSTIC LARYNGOSCOPY: CPT | Mod: S$GLB,,, | Performed by: NURSE PRACTITIONER

## 2024-10-09 PROCEDURE — 3008F BODY MASS INDEX DOCD: CPT | Mod: CPTII,S$GLB,, | Performed by: NURSE PRACTITIONER

## 2024-10-09 PROCEDURE — 3061F NEG MICROALBUMINURIA REV: CPT | Mod: CPTII,S$GLB,, | Performed by: NURSE PRACTITIONER

## 2024-10-09 PROCEDURE — 3080F DIAST BP >= 90 MM HG: CPT | Mod: CPTII,S$GLB,, | Performed by: NURSE PRACTITIONER

## 2024-10-09 PROCEDURE — 3044F HG A1C LEVEL LT 7.0%: CPT | Mod: CPTII,S$GLB,, | Performed by: NURSE PRACTITIONER

## 2024-10-09 PROCEDURE — 3077F SYST BP >= 140 MM HG: CPT | Mod: CPTII,S$GLB,, | Performed by: NURSE PRACTITIONER

## 2024-10-09 RX ORDER — AZELASTINE 1 MG/ML
2 SPRAY, METERED NASAL 2 TIMES DAILY
Qty: 30 ML | Refills: 3 | Status: SHIPPED | OUTPATIENT
Start: 2024-10-09 | End: 2024-10-09

## 2024-10-09 RX ORDER — FLUTICASONE PROPIONATE 50 MCG
2 SPRAY, SUSPENSION (ML) NASAL 2 TIMES DAILY
Qty: 18.2 ML | Refills: 3 | Status: SHIPPED | OUTPATIENT
Start: 2024-10-09 | End: 2024-10-09

## 2024-10-09 RX ORDER — FLUTICASONE PROPIONATE 50 MCG
2 SPRAY, SUSPENSION (ML) NASAL 2 TIMES DAILY
Qty: 16 G | Refills: 3 | Status: SHIPPED | OUTPATIENT
Start: 2024-10-09

## 2024-10-09 RX ORDER — PANTOPRAZOLE SODIUM 40 MG/1
40 TABLET, DELAYED RELEASE ORAL DAILY
Qty: 30 TABLET | Refills: 2 | Status: SHIPPED | OUTPATIENT
Start: 2024-10-09 | End: 2025-01-07

## 2024-10-09 RX ORDER — AZELASTINE 1 MG/ML
2 SPRAY, METERED NASAL 2 TIMES DAILY
Qty: 30 ML | Refills: 3 | Status: SHIPPED | OUTPATIENT
Start: 2024-10-09

## 2024-10-09 RX ORDER — PANTOPRAZOLE SODIUM 40 MG/1
40 TABLET, DELAYED RELEASE ORAL DAILY
Qty: 30 TABLET | Refills: 2 | Status: SHIPPED | OUTPATIENT
Start: 2024-10-09 | End: 2024-10-09

## 2024-10-09 NOTE — PROGRESS NOTES
OTOLARYNGOLOGY CLINIC NOTE  Date:  10/09/2024     Chief complaint:  Chief Complaint   Patient presents with    Hoarse    Sore Throat     Feels like throat is itchy and irritated, seen by ENT, waking up at night due to itchiness     History of Present Illness  Karey Palmer is a 49 y.o. female  presenting today for a new evaluation and treatment of hoarseness and sore throat for the past few months.  Pt reports her symptoms started a few months ago with nasal congestion, post nasal drip and rhinitis.  Pt reports her voice becomes raspy during this time.  Pt reports her symptoms improve some but never go away completely.  Pt reports using Flonase and Astelin for a few months daily and recently used Afrin for one day.  Pt reports the itchy throat is the worse symptom.  Pt report her throat feels irritated at the same time.  Pt denies any acid reflux, snoring or swallowing problems.      Review of medical records and prior documentation  Past medical records were reviewed with data pertinent to the chief complaint summarized in the HPI. Information obtained from review of medical records is attributed to respective sources in the HPI with reference to sources of information at their mention. Records reviewed included all recent notes from referring provider, primary care, and related subspecialty evaluations as available. This review of records was performed and additional data obtained to supplement history obtained from the patient and further inform medical decision making involved in formulating a plan of care accounting for all history and treatment relevant to the issues addressed.    Past Medical History  Past Medical History:   Diagnosis Date    Allergy     Essential (primary) hypertension       Past Surgical History  Past Surgical History:   Procedure Laterality Date    COLONOSCOPY N/A 1/13/2023    Procedure: COLONOSCOPY;  Surgeon: Suzanne Murray MD;  Location: [facility];  Service: Endoscopy;  Laterality:  N/A;  12/29 instructions emailed to [email or web address]    LAPAROSCOPIC SALPINGECTOMY Bilateral 12/8/2023    Procedure: SALPINGECTOMY, LAPAROSCOPIC;  Surgeon: Evens Oliveros III, MD;  Location: [facility];  Service: OB/GYN;  Laterality: Bilateral;  RN PREOP 11/28/2023----- TYPE&SCREEN---done---- UPT--NEG ON  12/5/2023@9:50  INCOMPLETE CONSENT AND H/P      Medications  Current Outpatient Medications on File Prior to Visit   Medication Sig Dispense Refill    cetirizine (ZYRTEC) 10 MG tablet Take 1 tablet (10 mg total) by mouth once daily. 30 tablet 2    COVID-19 AT-HOME TEST Kit use as directed      GARLIC ORAL Take by mouth.      ibuprofen (ADVIL,MOTRIN) 600 MG tablet Take 1 tablet (600 mg total) by mouth every 6 (six) hours as needed for Pain. 30 tablet 1    [DISCONTINUED] azelastine (ASTELIN) 137 mcg (0.1 %) nasal spray 1 spray (137 mcg total) by Nasal route daily as needed for Rhinitis. 30 mL 0    [DISCONTINUED] fluticasone propionate (FLONASE) 50 mcg/actuation nasal spray SHAKE LIQUID AND USE 1 SPRAY(50 MCG) IN EACH NOSTRIL EVERY DAY 32 g 3    [DISCONTINUED] pantoprazole (PROTONIX) 40 MG tablet 1 tablet Orally Once a day for 30 days       Current Facility-Administered Medications on File Prior to Visit   Medication Dose Route Frequency Provider Last Rate Last Admin    0.9%  NaCl infusion   Intravenous Continuous Evens Oliveros III, MD        mupirocin 2 % ointment   Nasal On Call Procedure Evens Oliveros III, MD   Given at 12/08/23 0630     Review of Systems  Review of Systems   Constitutional: Negative.    HENT:  Positive for congestion and sore throat.    Eyes: Negative.    Respiratory: Negative.     Cardiovascular: Negative.    Gastrointestinal: Negative.    Skin: Negative.    Neurological: Negative.       Social History   reports that she has never smoked. She has never used smokeless tobacco. She reports that she does not drink alcohol and does not use drugs.     Family History  Family History   Problem  "Relation Name Age of Onset    Pancreatic cancer Mother      Breast cancer Paternal Grandmother      Breast cancer Maternal Aunt      Breast cancer Cousin  30        Unsure of testing    Breast cancer Other paternal great aunt       Physical Exam   Vitals:    10/09/24 1009   BP: (!) 166/104   Pulse: 72   Resp: 16    Body mass index is 30.1 kg/m².  Weight: 79.5 kg (175 lb 6 oz)   Height: 5' 4" (162.6 cm)     GENERAL: no acute distress.  HEAD: normocephalic.   EYES: lids and lashes normal. No scleral icterus  EARS: external ear without lesion, normal pinna shape and position.  External auditory canal with normal cerumen, tympanic membrane fully visible, no perforation , no retraction. No middle ear effusion. Ossicles intact.   NOSE: external nose without significant bony abnormality  ORAL CAVITY/OROPHARYNX: tongue midline and mobile. Symmetric palate rise. Uvula midline.   NECK: trachea midline.   LYMPH NODES:No cervical lymphadenopathy.  RESPIRATORY: no stridor, no stertor. Voice normal. Respirations nonlabored.  NEURO: alert, responds to questions appropriately.   Cranial nerve exam as indicated in above sections and additionally showed facial movement symmetric with good eye closure and symmetric smile.   PSYCH:mood appropriate    PROCEDURE NOTE  NAME OF PROCEDURE: Flexible Laryngoscopy, diagnostic  INDICATIONS: gag reflex precludes mirror exam, throat pain in adult / dysphonia   FINDINGS:  adenoid hypertrophy, LPR, Deviated septum    Consent: After procedure was explained in detail and all questions answered, verbal consent was obtained for performing flexible laryngoscopy.  Anesthesia: topical 4% lidocaine and neosynephrine  Procedure: With patient in seated position, the scope was inserted into the bilateral nasal passageway and advanced atraumatically into the nasopharynx to examine the following structures:  Nasal cavity: Turbinates with moderate hypertrophy.  No purulent drainage.   Nasopharynx: no mass or " lesion noted in nasopharynx. Adenoid hypertrophy  Oropharynx: base of tongue without  mass or ulceration. Lingual tonsils normal in appearance  Hypopharynx: posterior pharyngeal wall without mass or lesion. No pooling of secretions. Pyriform sinuses visible without mass or lesion  Larynx: epiglottis normal without lesion. False vocal folds without edema/erythema/lesion. True vocal folds mobile and without lesion. Mild interarytenoid edema no erythema . Postcricoid region with mild edema no lesion   Subglottis: visualized portion of subglottis normal in appearance    After examination performed, the scope was removed atraumatically . The patient tolerated the procedure well. Photodocumentation obtained with representative images below, all images and/or videos uploaded in media section of epic.     Imaging:  The patient does not have any pertinent and/or recent imaging of the head and neck.     Labs:  CBC  Recent Labs   Lab 03/21/22  1206 11/28/23  1420 07/12/24  0911   WBC 4.99 6.61 4.73   Hemoglobin 12.8 12.4 13.6   Hematocrit 40.8 38.1 42.4   MCV 90 86 88   Platelets 302 281 293     BMP  Recent Labs   Lab 03/21/22  1206 11/28/23  1420 07/12/24  0911   Glucose 79 75 95   Sodium 141 138 138   Potassium 3.8 4.2 4.0   Chloride 107 107 103   CO2 24 25 25   BUN 7 7 9   Creatinine 0.8 0.8 0.9   Calcium 9.4 9.0 9.5     Assessment  1. Non-seasonal allergic rhinitis, unspecified trigger  - Ambulatory referral/consult to Allergy; Future  - azelastine (ASTELIN) 137 mcg (0.1 %) nasal spray; 2 sprays (274 mcg total) by Nasal route 2 (two) times daily.  Dispense: 30 mL; Refill: 3  - fluticasone propionate (FLONASE) 50 mcg/actuation nasal spray; 2 sprays (100 mcg total) by Each Nostril route 2 (two) times daily.  Dispense: 16 g; Refill: 3    2. Dysphonia    3. Nasal congestion  [facility]; Future    4. Deviated nasal septum    5. Laryngopharyngeal reflux (LPR)  - pantoprazole (PROTONIX) 40 MG tablet; Take 1  tablet (40 mg total) by mouth once daily.  Dispense: 30 tablet; Refill: 2     Plan:  Allergic rhinitis(AR):  discussed about pathophysiology of allergic disease and sinus disease. We discussed about treatment options for this including but not limited to medications and potential surgeries as well as when surgery is indicated.  - I recommend dual nasal spray therapy as combo of steroid and antihistamine nasal spray has been shown in evidence-based studies to be better than either alone.   -Discussed medication administration technique (point toward outer corner of eye and not towards nasal septum) and use nasal saline prior to medication sprays.   -We discussed importance of saline use prior to medication sprays to help sprays work better and to clean the nose.   -Counseled on risk of bleeding, risk of increased intraocular pressure/cataract with long term use.   -Discussed how to increase and decrease nasal spray regimen based on symptoms and that sprays can be used on prn basis but work best when used daily and take about 2-3 weeks to get to max level. If patient using sprays on a prn basis and symptoms not controlled should go back to daily /bid use  -discussed as well as gave patient physical documentation with photos about the saline and other medication sprays (paperwork summarized discussion topics).  Nasal Congestion/ Deviated Septum:  Pt advised to restart nasal sprays and CT scan ordered.  Pt will be notified of results at f/u appt.    Dysphonia/ LPR:  Pt advised she will start medication and Refrain from eating within 3 hours of going to bed, to elevate the head of bed very subtly and optimize the impact of gravity on the potential reflux, and to avoid alcohol, caffeine, tobacco, tomato sauce, spicy foods, fried food, and chocolate.  Discussed plan of care with patient in detail and all questions answered. Patient reported understanding of plan of care.

## 2024-10-16 ENCOUNTER — TELEPHONE (OUTPATIENT)
Dept: OTOLARYNGOLOGY | Facility: CLINIC | Age: 49
End: 2024-10-16
Payer: MEDICAID

## 2024-10-16 NOTE — TELEPHONE ENCOUNTER
----- Message from Nicole sent at 10/16/2024 11:08 AM CDT -----  Regarding: Appointment Scheduled  Contact: [phone]  Calling in regards to missed call from office to confirm appointment has been made for tomorrow. Please call and discuss.    [phone]

## 2024-10-16 NOTE — TELEPHONE ENCOUNTER
Spoke to pt states has an ENT appt on [facility] tomorrow, informed not in [facility] so not sure who its with.  Pt just seen Mrs. Min on 10/09/2024

## 2025-02-17 ENCOUNTER — TELEPHONE (OUTPATIENT)
Dept: FAMILY MEDICINE | Facility: CLINIC | Age: 50
End: 2025-02-17
Payer: COMMERCIAL

## 2025-02-17 DIAGNOSIS — Z12.31 ENCOUNTER FOR SCREENING MAMMOGRAM FOR BREAST CANCER: Primary | ICD-10-CM

## 2025-02-17 NOTE — TELEPHONE ENCOUNTER
----- Message from Summer sent at 2/17/2025  1:57 PM CST -----  Regarding: mri  Type:  Patient Returning Call  Name of who is calling:pt  What is request in detail:pt is requesting a call back in regards to MRI, pt received a letter saying it was time to schedule   one. Pt needs order put in.    Can clinic reply by [facility]:no  What number to call back if not in [facility]: [phone]

## 2025-02-17 NOTE — TELEPHONE ENCOUNTER
LM informing pt letter was for her mammogram; orders have been placed and she can call office to schedule

## 2025-02-27 ENCOUNTER — HOSPITAL ENCOUNTER (OUTPATIENT)
Dept: RADIOLOGY | Facility: HOSPITAL | Age: 50
Discharge: HOME | End: 2025-02-27
Payer: COMMERCIAL

## 2025-02-27 ENCOUNTER — RESULTS FOLLOW-UP (OUTPATIENT)
Dept: FAMILY MEDICINE | Facility: CLINIC | Age: 50
End: 2025-02-27

## 2025-02-27 DIAGNOSIS — Z12.31 ENCOUNTER FOR SCREENING MAMMOGRAM FOR BREAST CANCER: ICD-10-CM

## 2025-02-27 PROCEDURE — 77063 BREAST TOMOSYNTHESIS BI: CPT | Mod: 26,,, | Performed by: RADIOLOGY

## 2025-02-27 PROCEDURE — 77067 SCR MAMMO BI INCL CAD: CPT | Mod: 26,,, | Performed by: RADIOLOGY

## 2025-02-27 PROCEDURE — 77063 BREAST TOMOSYNTHESIS BI: CPT | Mod: TC

## 2025-03-20 DIAGNOSIS — I10 HYPERTENSION, ESSENTIAL, BENIGN: ICD-10-CM

## 2025-03-20 RX ORDER — HYDROCHLOROTHIAZIDE 12.5 MG/1
12.5 TABLET ORAL DAILY
Qty: 30 TABLET | Refills: 0 | Status: SHIPPED | OUTPATIENT
Start: 2025-03-20

## 2025-03-20 NOTE — TELEPHONE ENCOUNTER
Left message informing patient of refill approval and TRISHA Birtton's response.      Message sent via [facility].

## 2025-03-20 NOTE — TELEPHONE ENCOUNTER
----- Message from Tech Catarina sent at 3/20/2025  1:00 PM CDT -----  Regarding: Refill request  .Type: RX Refill RequestWho Called:self Have you contacted your pharmacy:yes Refill or New Rx: RefillRX Name and Strength:hydroCHLOROthiazide (HYDRODIURIL) 12.5 MG TabPreferred Pharmacy with phone number:.[id number] - [place], [place] - [address] [facility] [id number] [facility] [place] [address]Rbsav: [phone] Fax: [phone] or Mail Order:local Ordering Provider:Would the patient rather a call back or a response via [facility]? Call Best Call Back Number:.[phone] Information: requested urgent matter

## 2025-04-02 ENCOUNTER — TELEPHONE (OUTPATIENT)
Dept: FAMILY MEDICINE | Facility: CLINIC | Age: 50
End: 2025-04-02
Payer: COMMERCIAL

## 2025-04-28 DIAGNOSIS — I10 HYPERTENSION, ESSENTIAL, BENIGN: ICD-10-CM

## 2025-04-28 RX ORDER — HYDROCHLOROTHIAZIDE 12.5 MG/1
12.5 TABLET ORAL DAILY
Qty: 30 TABLET | Refills: 0 | Status: SHIPPED | OUTPATIENT
Start: 2025-04-28 | End: 2025-05-01 | Stop reason: SDUPTHER

## 2025-04-28 NOTE — TELEPHONE ENCOUNTER
Last Office Visit Info:   The patient's last visit with No primary care provider on file. was on Patient does not have a PCP or has not yet seen their PCP.    The patient's last visit in current department was on 7/12/2024.        Last CBC Results:   Lab Results   Component Value Date    WBC 4.73 07/12/2024    HGB 13.6 07/12/2024    HCT 42.4 07/12/2024     07/12/2024       Last CMP Results  Lab Results   Component Value Date     07/12/2024    K 4.0 07/12/2024     07/12/2024    CO2 25 07/12/2024    BUN 9 07/12/2024    CREATININE 0.9 07/12/2024    CALCIUM 9.5 07/12/2024    ALBUMIN 4.0 07/12/2024    AST 21 07/12/2024    ALT 25 07/12/2024       Last Lipids  Lab Results   Component Value Date    CHOL 240 (H) 07/12/2024    TRIG 69 07/12/2024    HDL 80 (H) 07/12/2024    LDLCALC 146.2 07/12/2024       Last A1C  Lab Results   Component Value Date    HGBA1C 5.5 07/12/2024       Last TSH  Lab Results   Component Value Date    TSH 1.260 07/12/2024             Current Med Refills  Medication List with Changes/Refills   Current Medications    AZELASTINE (ASTELIN) 137 MCG (0.1 %) NASAL SPRAY    2 sprays (274 mcg total) by Nasal route 2 (two) times daily.       Start Date: 10/9/2024 End Date: --    CETIRIZINE (ZYRTEC) 10 MG TABLET    Take 1 tablet (10 mg total) by mouth once daily.       Start Date: 2/23/2024 End Date: 2/22/2025    COVID-19 AT-HOME TEST KIT    use as directed       Start Date: 3/14/2024 End Date: --    FLUTICASONE PROPIONATE (FLONASE) 50 MCG/ACTUATION NASAL SPRAY    2 sprays (100 mcg total) by Each Nostril route 2 (two) times daily.       Start Date: 10/9/2024 End Date: --    GARLIC ORAL    Take by mouth.       Start Date: --        End Date: --    HYDROCHLOROTHIAZIDE 12.5 MG TAB    Take 1 tablet (12.5 mg total) by mouth once daily.       Start Date: 3/20/2025 End Date: --    IBUPROFEN (ADVIL,MOTRIN) 600 MG TABLET    Take 1 tablet (600 mg total) by mouth every 6 (six) hours as needed for Pain.        Start Date: 12/8/2023 End Date: --    PANTOPRAZOLE (PROTONIX) 40 MG TABLET    Take 1 tablet (40 mg total) by mouth once daily.       Start Date: 10/9/2024 End Date: 1/7/2025       Order(s) placed per written order guidelines: none    Please advise.

## 2025-04-28 NOTE — TELEPHONE ENCOUNTER
----- Message from Neda sent at 4/28/2025  1:33 PM CDT -----  Regarding: Self   Type: RX Refill RequestWho Called: Self Have you contacted your pharmacy: yes RefillRX Name and Strength:hydroCHLOROthiazide 12.5 MG Tab Preferred Pharm[facility] with phone number: [id number] - [place], [place] - [address] [facility] [id number] [place] [address]Qkbcl: [phone] Fax: [phone] or Mail Order: local Would the patient rather a call back or a response via [facility]? Call back Best Call Back Number:.[phone] Information: Thank you.

## 2025-05-01 DIAGNOSIS — I10 HYPERTENSION, ESSENTIAL, BENIGN: ICD-10-CM

## 2025-05-01 RX ORDER — HYDROCHLOROTHIAZIDE 12.5 MG/1
12.5 TABLET ORAL DAILY
Qty: 30 TABLET | Refills: 0 | Status: SHIPPED | OUTPATIENT
Start: 2025-05-01

## 2025-05-06 DIAGNOSIS — R49.0 HOARSENESS: Primary | ICD-10-CM

## 2025-05-09 DIAGNOSIS — J30.1 SEASONAL ALLERGIC RHINITIS DUE TO POLLEN: Primary | ICD-10-CM

## 2025-05-09 RX ORDER — LEVOCETIRIZINE DIHYDROCHLORIDE 5 MG/1
5 TABLET, FILM COATED ORAL NIGHTLY
Qty: 90 TABLET | Refills: 0 | Status: SHIPPED | OUTPATIENT
Start: 2025-05-09 | End: 2026-05-09

## 2025-05-09 NOTE — TELEPHONE ENCOUNTER
----- Message from Awarepoint sent at 5/9/2025 11:23 AM CDT -----  Who Called:selfRefill or New Rx:RX Name and Strength:levocetirizine 5 mg Is this a 30 day or 90 day RX:Preferred Pharmacy with phone number:[id number] - [place] [address] the patient rather a call back or a response via [facility]?call Best Call Back Number:.[phone] Information: pt not sure who her pcp is stated she hasn't saw a new one yet call pt please to very belinda sent over the request

## 2025-05-09 NOTE — TELEPHONE ENCOUNTER
Spoke to pt and advised her she hasn't established care with a new provider; I scheduled her with dr cobian for the end of the month but she would like refill sent now

## 2025-05-26 ENCOUNTER — OFFICE VISIT (OUTPATIENT)
Dept: FAMILY MEDICINE | Facility: CLINIC | Age: 50
End: 2025-05-26
Payer: COMMERCIAL

## 2025-05-26 VITALS
BODY MASS INDEX: 29.62 KG/M2 | OXYGEN SATURATION: 99 % | HEIGHT: 64 IN | HEART RATE: 79 BPM | DIASTOLIC BLOOD PRESSURE: 102 MMHG | RESPIRATION RATE: 18 BRPM | SYSTOLIC BLOOD PRESSURE: 146 MMHG | TEMPERATURE: 99 F | WEIGHT: 173.5 LBS

## 2025-05-26 DIAGNOSIS — R10.2 PELVIC PAIN: ICD-10-CM

## 2025-05-26 DIAGNOSIS — G89.29 CHRONIC MIDLINE LOW BACK PAIN WITHOUT SCIATICA: ICD-10-CM

## 2025-05-26 DIAGNOSIS — I10 ESSENTIAL HYPERTENSION: ICD-10-CM

## 2025-05-26 DIAGNOSIS — L91.0 KELOID OF SKIN: ICD-10-CM

## 2025-05-26 DIAGNOSIS — Z00.00 ANNUAL PHYSICAL EXAM: Primary | ICD-10-CM

## 2025-05-26 DIAGNOSIS — K21.9 GASTROESOPHAGEAL REFLUX DISEASE, UNSPECIFIED WHETHER ESOPHAGITIS PRESENT: ICD-10-CM

## 2025-05-26 DIAGNOSIS — M54.50 CHRONIC MIDLINE LOW BACK PAIN WITHOUT SCIATICA: ICD-10-CM

## 2025-05-26 PROBLEM — R31.9 HEMATURIA: Status: RESOLVED | Noted: 2024-07-15 | Resolved: 2025-05-26

## 2025-05-26 PROBLEM — Z23 NEED FOR TDAP VACCINATION: Status: RESOLVED | Noted: 2024-07-15 | Resolved: 2025-05-26

## 2025-05-26 PROBLEM — Z30.2 ENCOUNTER FOR FEMALE STERILIZATION PROCEDURE: Status: RESOLVED | Noted: 2023-12-07 | Resolved: 2025-05-26

## 2025-05-26 PROBLEM — Z01.00 ROUTINE EYE EXAM: Status: RESOLVED | Noted: 2024-07-15 | Resolved: 2025-05-26

## 2025-05-26 PROCEDURE — 3008F BODY MASS INDEX DOCD: CPT | Mod: CPTII,S$GLB,, | Performed by: INTERNAL MEDICINE

## 2025-05-26 PROCEDURE — 99999 PR PBB SHADOW E&M-EST. PATIENT-LVL V: CPT | Mod: PBBFAC,,, | Performed by: INTERNAL MEDICINE

## 2025-05-26 PROCEDURE — 3080F DIAST BP >= 90 MM HG: CPT | Mod: CPTII,S$GLB,, | Performed by: INTERNAL MEDICINE

## 2025-05-26 PROCEDURE — 1160F RVW MEDS BY RX/DR IN RCRD: CPT | Mod: CPTII,S$GLB,, | Performed by: INTERNAL MEDICINE

## 2025-05-26 PROCEDURE — 1159F MED LIST DOCD IN RCRD: CPT | Mod: CPTII,S$GLB,, | Performed by: INTERNAL MEDICINE

## 2025-05-26 PROCEDURE — 99396 PREV VISIT EST AGE 40-64: CPT | Mod: S$GLB,,, | Performed by: INTERNAL MEDICINE

## 2025-05-26 PROCEDURE — 3075F SYST BP GE 130 - 139MM HG: CPT | Mod: CPTII,S$GLB,, | Performed by: INTERNAL MEDICINE

## 2025-05-26 RX ORDER — TETRACYCLINE HYDROCHLORIDE 500 MG/1
500 CAPSULE ORAL 2 TIMES DAILY
COMMUNITY
Start: 2025-01-02 | End: 2025-05-26

## 2025-05-26 RX ORDER — ONDANSETRON 8 MG/1
8 TABLET, ORALLY DISINTEGRATING ORAL EVERY 6 HOURS PRN
COMMUNITY
Start: 2025-01-02 | End: 2025-05-26

## 2025-05-26 RX ORDER — ONDANSETRON 4 MG/1
4 TABLET, ORALLY DISINTEGRATING ORAL EVERY 6 HOURS PRN
COMMUNITY
Start: 2024-12-13 | End: 2025-05-26

## 2025-05-26 RX ORDER — AMOXICILLIN 500 MG/1
1000 TABLET, FILM COATED ORAL 2 TIMES DAILY
COMMUNITY
Start: 2024-12-06 | End: 2025-05-26

## 2025-05-26 RX ORDER — LOSARTAN POTASSIUM AND HYDROCHLOROTHIAZIDE 12.5; 5 MG/1; MG/1
1 TABLET ORAL DAILY
Qty: 90 TABLET | Refills: 3 | Status: SHIPPED | OUTPATIENT
Start: 2025-05-26 | End: 2026-05-26

## 2025-05-26 RX ORDER — CLARITHROMYCIN 500 MG/1
1000 TABLET, FILM COATED ORAL 2 TIMES DAILY
COMMUNITY
Start: 2024-12-06 | End: 2025-05-26

## 2025-05-26 RX ORDER — METRONIDAZOLE 250 MG/1
250 TABLET ORAL 4 TIMES DAILY
COMMUNITY
Start: 2024-12-30 | End: 2025-05-26

## 2025-05-26 RX ORDER — LANSOPRAZOLE 30 MG/1
30 CAPSULE, DELAYED RELEASE ORAL
COMMUNITY
Start: 2024-12-06 | End: 2025-05-26 | Stop reason: ALTCHOICE

## 2025-05-26 RX ORDER — PANTOPRAZOLE SODIUM 40 MG/1
30 TABLET, DELAYED RELEASE ORAL
COMMUNITY
Start: 2025-01-31

## 2025-05-26 RX ORDER — BISMUTH SUBSALICYLATE 262 MG/1
1 TABLET ORAL 4 TIMES DAILY
COMMUNITY
Start: 2024-12-30 | End: 2025-05-26

## 2025-05-26 NOTE — PROGRESS NOTES
Chief Complaint: Establish Care, Annual Exam, and Cyst (Belly button )      Karey Palmer  is a 50 y.o. year old patient who presents today for     History of Present Illness    CHIEF COMPLAINT:  Karey presents today for annual follow-up and concerns about a growing cyst in belly button    UMBILICAL CYST:  She reports a progressively enlarging umbilical cyst that developed following tubal ligation surgery. She experiences pain when the cyst is bumped.    HYPERTENSION:  She reports inadequate blood pressure control on current medication regimen, having difficulty achieving target blood pressure of 120 mmHg. She has a family history of blood pressure issues.    GASTROINTESTINAL:  She has a history of acid reflux managed with as-needed pantoprazole. She reports lower abdominal and back aches, which she attributes to lack of stretching and exercise.    GYNECOLOGIC HISTORY:  She has history of Depo use for over 5 years with infrequent periods. Following tubal ligation, she reports amenorrhea.    MEDICATIONS:  She takes hydrochlorothiazide 25 mg for blood pressure, pantoprazole as needed for acid reflux, and allergy medication as needed.    SOCIAL HISTORY:  She works as a . She reports rare alcohol use with occasional daiquiris. She denies use of nicotine products and illicit drugs.      ROS:  General: -fever, -chills, -fatigue, -weight gain, -weight loss  Eyes: -vision changes, -redness, -discharge  ENT: -ear pain, -nasal congestion, -sore throat  Cardiovascular: -chest pain, -palpitations, -lower extremity edema  Respiratory: -cough, -shortness of breath  Gastrointestinal: +abdominal pain, -nausea, -vomiting, -diarrhea, -constipation, -blood in stool, +indigestion  Genitourinary: -dysuria, -hematuria, -frequency  Musculoskeletal: -joint pain, -muscle pain, +back pain  Skin: -rash, -lesion, +lumps/masses, +skin growth, +itching  Neurological: -headache, -dizziness, -numbness, -tingling  Psychiatric:  -anxiety, -depression, -sleep difficulty  Allergic: +allergic reactions  Female Genitourinary: +absent or irregular periods         Past Medical History:   Diagnosis Date    Allergy     Encounter for female sterilization procedure 12/07/2023    Essential (primary) hypertension        Past Surgical History:   Procedure Laterality Date    COLONOSCOPY N/A 1/13/2023    Procedure: COLONOSCOPY;  Surgeon: Suzanne Murray MD;  Location: [facility] ENDO;  Service: Endoscopy;  Laterality: N/A;  12/29 instructions emailed to [email or web address]    LAPAROSCOPIC SALPINGECTOMY Bilateral 12/8/2023    Procedure: SALPINGECTOMY, LAPAROSCOPIC;  Surgeon: Evens Oliveros III, MD;  Location: [facility] OR;  Service: OB/GYN;  Laterality: Bilateral;  RN PREOP 11/28/2023----- TYPE&SCREEN---done---- UPT--NEG ON  12/5/2023@9:50  INCOMPLETE CONSENT AND H/P        Family History   Problem Relation Name Age of Onset    Pancreatic cancer Mother [place] Bright     Cancer Mother Catia Bright     Breast cancer Paternal Grandmother      Breast cancer Maternal Aunt      Breast cancer Cousin  30        Unsure of testing    Breast cancer Other paternal great aunt         Social History     Socioeconomic History    Marital status:     Number of children: 2   Occupational History    Occupation: Bus drivers     Employer: [employer]   Tobacco Use    Smoking status: Never    Smokeless tobacco: Never   Vaping Use    Vaping status: Never Used   Substance and Sexual Activity    Alcohol use: No    Drug use: No    Sexual activity: Yes     Partners: Male     Birth control/protection: See Surgical Hx     Comment: Tubes removed   Social History Narrative    Drives tour buses     Social Drivers of Health     Financial Resource Strain: Low Risk  (5/19/2025)    Overall Financial Resource Strain (CARDIA)     Difficulty of Paying Living Expenses: Not very hard   Food Insecurity: Patient Declined (5/19/2025)    Hunger Vital Sign     Worried About Running Out of  Food in the Last Year: Patient declined     Ran Out of Food in the Last Year: Patient declined   Transportation Needs: No Transportation Needs (5/19/2025)    PRAPARE - Transportation     Lack of Transportation (Medical): No     Lack of Transportation (Non-Medical): No   Physical Activity: Sufficiently Active (5/19/2025)    Exercise Vital Sign     Days of Exercise per Week: 5 days     Minutes of Exercise per Session: 30 min   Stress: No Stress Concern Present (5/19/2025)    [employer] [place] [employer] - Occupational Stress Questionnaire     Feeling of Stress : Not at all   Housing Stability: Patient Declined (5/19/2025)    Housing Stability Vital Sign     Unable to Pay for Housing in the Last Year: Patient declined     Homeless in the Last Year: Patient declined       Current Medications[1]           Objective:      Vitals:    05/26/25 1123   BP: (!) 146/102   Pulse:    Resp:    Temp:        Physical Exam  Vitals and nursing note reviewed.   Constitutional:       Appearance: Normal appearance.   HENT:      Head: Normocephalic and atraumatic.   Cardiovascular:      Rate and Rhythm: Normal rate and regular rhythm.   Pulmonary:      Effort: Pulmonary effort is normal.      Breath sounds: Normal breath sounds. No wheezing or rales.   Abdominal:      Palpations: Abdomen is soft.      Tenderness: There is no abdominal tenderness.   Musculoskeletal:         General: Normal range of motion.      Right lower leg: No edema.      Left lower leg: No edema.   Skin:     General: Skin is warm and dry.   Neurological:      General: No focal deficit present.      Mental Status: She is alert and oriented to person, place, and time.   Psychiatric:         Mood and Affect: Mood normal.         Behavior: Behavior normal.          Assessment:       1. Annual physical exam    2. Keloid of skin    3. Essential hypertension    4. Gastroesophageal reflux disease, unspecified whether esophagitis present    5. Pelvic pain    6.  Chronic midline low back pain without sciatica          Plan:   1. Annual physical exam  Assessment & Plan:  Physical exam completed, with results as mentioned above  Discussed HCM with patient    - annual labs ordered  - last pap smear on 1/12/2024 , repeat 5 years  - Mammogram: Met on 2/27/2025  - Last Colonoscopy completed on 1/13/2023 , repeat 10 years  - advised patient to follow up with ophthalmologist and dentist every year  - reviewed medical, surgical, family and social history        2. Keloid of skin  Assessment & Plan:  - Identified a growing cyst in the umbilical area, possibly a keloid.  - The growth appears benign and is not causing pain unless traumatized.  - Referred to dermatology for evaluation and potential excision.    Orders:  -     Ambulatory referral/consult to Dermatology; Future; Expected date: 06/02/2025    3. Essential hypertension  Assessment & Plan:  - BP management is insufficient with current HCTZ 25 mg alone, with fluctuations sometimes reaching 140 (goal: 130/80, potential future goal: 120).  - Initiating combination therapy with losartan/HCTZ (Hyzaar) 50/12.5 mg daily to improve BP control and provide renal protection, replacing current HCTZ.  - Continue current HCTZ 25 mg daily until Hyzaar is available, with option to take twice daily if needed.  - Emphasized importance of consistent daily medication, maintaining current weight, and monitoring diet (particularly sodium intake and processed foods) for BP management.  - Recent lab results from July show cholesterol and potassium levels within acceptable ranges.  - Ordered laboratory studies.  - Scheduled BP check with nurse in 2-3 weeks to assess efficacy of new medication.  - Contact office if new medication causes persistent side effects beyond 1 week of use.    Orders:  -     losartan-hydrochlorothiazide 50-12.5 mg (HYZAAR) 50-12.5 mg per tablet; Take 1 tablet by mouth once daily.  Dispense: 90 tablet; Refill: 3  -     TSH;  Future; Expected date: 05/26/2025  -     Hemoglobin A1C; Future; Expected date: 05/26/2025  -     Lipid Panel; Future; Expected date: 05/26/2025  -     CBC Auto Differential; Future; Expected date: 05/26/2025  -     Comprehensive Metabolic Panel; Future; Expected date: 05/26/2025    4. Gastroesophageal reflux disease, unspecified whether esophagitis present  Assessment & Plan:  - Karey experiences pruritus possibly related to acid reflux after consuming certain foods.  - Continued pantoprazole for management.      5. Pelvic pain  Assessment & Plan:  - Karey reports aches in the lumbar and abdominal region, possibly related to aging or post-surgical healing.  - Recommend ibuprofen for relief.  - Instructed to monitor for persistent abdominal pain and report if it continues, especially any changes in urination or bowel movements.  - Will reassess at 3-month follow-up.      6. Chronic midline low back pain without sciatica  Assessment & Plan:  - Karey reports lower back pain, possibly related to posture and lack of stretching.  - Recommend implementing lumbar stretches and maintaining proper posture while driving.        ## AMENORRHEA:  - Karey has not had menstrual cycles, possibly due to long-term use of medroxyprogesterone acetate and recent tubal ligation.    ## TUBAL LIGATION STATUS:  - Documented recent tubal ligation surgery, which may be contributing to current amenorrhea.    ## LIFESTYLE-RELATED ISSUES:  - Karey is careful with diet, avoiding processed foods and minimizing sodium intake to manage blood pressure.  - Explained potential benefits of home-cooked meals versus dining out.  - Advised to continue efforts to prepare more meals at home and maintain current weight.  - Symptoms do not meet clinical criteria for anxiety disorder; clarified difference between normal life stressors and clinical anxiety.    ## FOLLOW-UP:  - Scheduled appointment in 3 months.         Follow up in about 3 months  (around 8/26/2025).    This note was generated with the assistance of ambient listening technology. Verbal consent was obtained by the patient and accompanying visitor(s) for the recording of patient appointment to facilitate this note. I attest to having reviewed and edited the generated note for accuracy, though some syntax or spelling errors may persist. Please contact the author of this note for any clarification.                [1]   Current Outpatient Medications:     azelastine (ASTELIN) 137 mcg (0.1 %) nasal spray, 2 sprays (274 mcg total) by Nasal route 2 (two) times daily., Disp: 30 mL, Rfl: 3    fluticasone propionate (FLONASE) 50 mcg/actuation nasal spray, 2 sprays (100 mcg total) by Each Nostril route 2 (two) times daily., Disp: 16 g, Rfl: 3    GARLIC ORAL, Take by mouth., Disp: , Rfl:     ibuprofen (ADVIL,MOTRIN) 600 MG tablet, Take 1 tablet (600 mg total) by mouth every 6 (six) hours as needed for Pain., Disp: 30 tablet, Rfl: 1    levocetirizine (XYZAL) 5 MG tablet, Take 1 tablet (5 mg total) by mouth every evening., Disp: 90 tablet, Rfl: 0    pantoprazole (PROTONIX) 40 MG tablet, 30 tablets., Disp: , Rfl:     losartan-hydrochlorothiazide 50-12.5 mg (HYZAAR) 50-12.5 mg per tablet, Take 1 tablet by mouth once daily., Disp: 90 tablet, Rfl: 3  No current facility-administered medications for this visit.    Facility-Administered Medications Ordered in Other Visits:     0.9%  NaCl infusion, , Intravenous, Continuous, Evens Oliveros III, MD    mupirocin 2 % ointment, , Nasal, On Call Procedure, Evens Oliveros III, MD, Given at 12/08/23 0630

## 2025-05-26 NOTE — ASSESSMENT & PLAN NOTE
- Identified a growing cyst in the umbilical area, possibly a keloid.  - The growth appears benign and is not causing pain unless traumatized.  - Referred to dermatology for evaluation and potential excision.

## 2025-05-26 NOTE — ASSESSMENT & PLAN NOTE
Physical exam completed, with results as mentioned above  Discussed HCM with patient    - annual labs ordered  - last pap smear on 1/12/2024 , repeat 5 years  - Mammogram: Met on 2/27/2025  - Last Colonoscopy completed on 1/13/2023 , repeat 10 years  - advised patient to follow up with ophthalmologist and dentist every year  - reviewed medical, surgical, family and social history

## 2025-05-26 NOTE — ASSESSMENT & PLAN NOTE
- Karey reports aches in the lumbar and abdominal region, possibly related to aging or post-surgical healing.  - Recommend ibuprofen for relief.  - Instructed to monitor for persistent abdominal pain and report if it continues, especially any changes in urination or bowel movements.  - Will reassess at 3-month follow-up.

## 2025-05-26 NOTE — ASSESSMENT & PLAN NOTE
- BP management is insufficient with current HCTZ 25 mg alone, with fluctuations sometimes reaching 140 (goal: 130/80, potential future goal: 120).  - Initiating combination therapy with losartan/HCTZ (Hyzaar) 50/12.5 mg daily to improve BP control and provide renal protection, replacing current HCTZ.  - Continue current HCTZ 25 mg daily until Hyzaar is available, with option to take twice daily if needed.  - Emphasized importance of consistent daily medication, maintaining current weight, and monitoring diet (particularly sodium intake and processed foods) for BP management.  - Recent lab results from July show cholesterol and potassium levels within acceptable ranges.  - Ordered laboratory studies.  - Scheduled BP check with nurse in 2-3 weeks to assess efficacy of new medication.  - Contact office if new medication causes persistent side effects beyond 1 week of use.

## 2025-05-26 NOTE — ASSESSMENT & PLAN NOTE
- Karey reports lower back pain, possibly related to posture and lack of stretching.  - Recommend implementing lumbar stretches and maintaining proper posture while driving.

## 2025-05-26 NOTE — ASSESSMENT & PLAN NOTE
- Karey experiences pruritus possibly related to acid reflux after consuming certain foods.  - Continued pantoprazole for management.

## 2025-05-26 NOTE — PROGRESS NOTES
Health Maintenance Due   Topic     COVID-19 Vaccine (4 - 2024-25 season) Consult pcp. Season over

## 2025-06-09 ENCOUNTER — PATIENT MESSAGE (OUTPATIENT)
Dept: ADMINISTRATIVE | Facility: HOSPITAL | Age: 50
End: 2025-06-09
Payer: COMMERCIAL

## 2025-06-17 ENCOUNTER — CLINICAL SUPPORT (OUTPATIENT)
Dept: FAMILY MEDICINE | Facility: CLINIC | Age: 50
End: 2025-06-17
Payer: COMMERCIAL

## 2025-06-17 ENCOUNTER — LAB VISIT (OUTPATIENT)
Dept: LAB | Facility: HOSPITAL | Age: 50
End: 2025-06-17
Attending: INTERNAL MEDICINE
Payer: COMMERCIAL

## 2025-06-17 VITALS — SYSTOLIC BLOOD PRESSURE: 130 MMHG | DIASTOLIC BLOOD PRESSURE: 88 MMHG

## 2025-06-17 DIAGNOSIS — I10 ESSENTIAL HYPERTENSION: ICD-10-CM

## 2025-06-17 DIAGNOSIS — I10 ESSENTIAL HYPERTENSION: Primary | ICD-10-CM

## 2025-06-17 LAB
ABSOLUTE EOSINOPHIL (OHS): 0.13 K/UL
ABSOLUTE MONOCYTE (OHS): 0.3 K/UL (ref 0.3–1)
ABSOLUTE NEUTROPHIL COUNT (OHS): 2.76 K/UL (ref 1.8–7.7)
ALBUMIN SERPL BCP-MCNC: 4.3 G/DL (ref 3.5–5.2)
ALP SERPL-CCNC: 69 UNIT/L (ref 40–150)
ALT SERPL W/O P-5'-P-CCNC: 14 UNIT/L (ref 10–44)
ANION GAP (OHS): 10 MMOL/L (ref 8–16)
AST SERPL-CCNC: 16 UNIT/L (ref 11–45)
BASOPHILS # BLD AUTO: 0.04 K/UL
BASOPHILS NFR BLD AUTO: 0.8 %
BILIRUB SERPL-MCNC: 0.4 MG/DL (ref 0.1–1)
BUN SERPL-MCNC: 10 MG/DL (ref 6–20)
CALCIUM SERPL-MCNC: 9.3 MG/DL (ref 8.7–10.5)
CHLORIDE SERPL-SCNC: 103 MMOL/L (ref 95–110)
CHOLEST SERPL-MCNC: 251 MG/DL (ref 120–199)
CHOLEST/HDLC SERPL: 3.1 {RATIO} (ref 2–5)
CO2 SERPL-SCNC: 26 MMOL/L (ref 23–29)
CREAT SERPL-MCNC: 0.9 MG/DL (ref 0.5–1.4)
ERYTHROCYTE [DISTWIDTH] IN BLOOD BY AUTOMATED COUNT: 13.9 % (ref 11.5–14.5)
GFR SERPLBLD CREATININE-BSD FMLA CKD-EPI: >60 ML/MIN/1.73/M2
GLUCOSE SERPL-MCNC: 81 MG/DL (ref 70–110)
HCT VFR BLD AUTO: 41.3 % (ref 37–48.5)
HDLC SERPL-MCNC: 82 MG/DL (ref 40–75)
HDLC SERPL: 32.7 % (ref 20–50)
HGB BLD-MCNC: 13.1 GM/DL (ref 12–16)
IMM GRANULOCYTES # BLD AUTO: 0.01 K/UL (ref 0–0.04)
IMM GRANULOCYTES NFR BLD AUTO: 0.2 % (ref 0–0.5)
LDLC SERPL CALC-MCNC: 156.6 MG/DL (ref 63–159)
LYMPHOCYTES # BLD AUTO: 1.93 K/UL (ref 1–4.8)
MCH RBC QN AUTO: 27.5 PG (ref 27–31)
MCHC RBC AUTO-ENTMCNC: 31.7 G/DL (ref 32–36)
MCV RBC AUTO: 87 FL (ref 82–98)
NONHDLC SERPL-MCNC: 169 MG/DL
NUCLEATED RBC (/100WBC) (OHS): 0 /100 WBC
PLATELET # BLD AUTO: 277 K/UL (ref 150–450)
PMV BLD AUTO: 9.7 FL (ref 9.2–12.9)
POTASSIUM SERPL-SCNC: 4.3 MMOL/L (ref 3.5–5.1)
PROT SERPL-MCNC: 7.4 GM/DL (ref 6–8.4)
RBC # BLD AUTO: 4.77 M/UL (ref 4–5.4)
RELATIVE EOSINOPHIL (OHS): 2.5 %
RELATIVE LYMPHOCYTE (OHS): 37.3 % (ref 18–48)
RELATIVE MONOCYTE (OHS): 5.8 % (ref 4–15)
RELATIVE NEUTROPHIL (OHS): 53.4 % (ref 38–73)
SODIUM SERPL-SCNC: 139 MMOL/L (ref 136–145)
TRIGL SERPL-MCNC: 62 MG/DL (ref 30–150)
TSH SERPL-ACNC: 1.12 UIU/ML (ref 0.4–4)
WBC # BLD AUTO: 5.17 K/UL (ref 3.9–12.7)

## 2025-06-17 PROCEDURE — 82247 BILIRUBIN TOTAL: CPT

## 2025-06-17 PROCEDURE — 99999 PR PBB SHADOW E&M-EST. PATIENT-LVL I: CPT | Mod: PBBFAC,,,

## 2025-06-17 PROCEDURE — 85025 COMPLETE CBC W/AUTO DIFF WBC: CPT

## 2025-06-17 PROCEDURE — 84443 ASSAY THYROID STIM HORMONE: CPT

## 2025-06-17 PROCEDURE — 36415 COLL VENOUS BLD VENIPUNCTURE: CPT | Mod: PO

## 2025-06-17 PROCEDURE — 82465 ASSAY BLD/SERUM CHOLESTEROL: CPT

## 2025-06-17 PROCEDURE — 83036 HEMOGLOBIN GLYCOSYLATED A1C: CPT

## 2025-06-17 NOTE — PROGRESS NOTES
Karey Palmer 50 y.o. female is here today for Blood Pressure check.   History of HTN yes.    Review of patient's allergies indicates:   Allergen Reactions    Tree nuts Other (See Comments)     Creatinine   Date Value Ref Range Status   07/12/2024 0.9 0.5 - 1.4 mg/dL Final     Sodium   Date Value Ref Range Status   07/12/2024 138 136 - 145 mmol/L Final     Potassium   Date Value Ref Range Status   07/12/2024 4.0 3.5 - 5.1 mmol/L Final   ]  Patient verifies taking blood pressure medications on a regular basis at the same time of the day.   Current Medications[1]  Does patient have record of home blood pressure readings no. Readings have been averaging not done.   Last dose of blood pressure medication was taken at yesterday evening.  Patient is asymptomatic.   Complains of no complaints.    Vitals:    06/17/25 1036   BP: 130/88   BP Location: Right arm   Patient Position: Sitting         Dr. Orozco informed of nurse visit.            [1]   Current Outpatient Medications:     azelastine (ASTELIN) 137 mcg (0.1 %) nasal spray, 2 sprays (274 mcg total) by Nasal route 2 (two) times daily., Disp: 30 mL, Rfl: 3    fluticasone propionate (FLONASE) 50 mcg/actuation nasal spray, 2 sprays (100 mcg total) by Each Nostril route 2 (two) times daily., Disp: 16 g, Rfl: 3    GARLIC ORAL, Take by mouth., Disp: , Rfl:     ibuprofen (ADVIL,MOTRIN) 600 MG tablet, Take 1 tablet (600 mg total) by mouth every 6 (six) hours as needed for Pain., Disp: 30 tablet, Rfl: 1    levocetirizine (XYZAL) 5 MG tablet, Take 1 tablet (5 mg total) by mouth every evening., Disp: 90 tablet, Rfl: 0    losartan-hydrochlorothiazide 50-12.5 mg (HYZAAR) 50-12.5 mg per tablet, Take 1 tablet by mouth once daily., Disp: 90 tablet, Rfl: 3    pantoprazole (PROTONIX) 40 MG tablet, 30 tablets., Disp: , Rfl:   No current facility-administered medications for this visit.    Facility-Administered Medications Ordered in Other Visits:     0.9%  NaCl infusion, , Intravenous,  Continuous, Evens Oliveros III, MD    mupirocin 2 % ointment, , Nasal, On Call Procedure, Evens Oliveros III, MD, Given at 12/08/23 0630

## 2025-06-18 ENCOUNTER — RESULTS FOLLOW-UP (OUTPATIENT)
Dept: FAMILY MEDICINE | Facility: CLINIC | Age: 50
End: 2025-06-18

## 2025-06-18 LAB
EAG (OHS): 117 MG/DL (ref 68–131)
HBA1C MFR BLD: 5.7 % (ref 4–5.6)

## 2025-06-30 ENCOUNTER — TELEPHONE (OUTPATIENT)
Dept: DERMATOLOGY | Facility: CLINIC | Age: 50
End: 2025-06-30
Payer: COMMERCIAL

## 2025-07-25 ENCOUNTER — TELEPHONE (OUTPATIENT)
Dept: FAMILY MEDICINE | Facility: CLINIC | Age: 50
End: 2025-07-25
Payer: COMMERCIAL

## 2025-07-25 NOTE — TELEPHONE ENCOUNTER
Pt notified of message from dr Orozco. Confirmed upcoming appt        Hello, your lab results shows the following:     - Your A1c (marker for diabetes) was 5.7% which means you have prediabetes, which is what you get before diabetes. To improve this, I recommend a diet with reduced processed carbs like rice and white bread, reduce your sugar intake and weight loss. If lifestyle modifications don't work, we can consider starting metformin.      - the rest of your labs were normal      Don't hesitate to reach out with any questions. Take care.     Best,  Dr. Orozco

## (undated) DEVICE — PAD PREP 50/CA

## (undated) DEVICE — TRAY CATH FOL SIL TEMP 10 16FR

## (undated) DEVICE — TUBING INSUFFLATION 10

## (undated) DEVICE — SUT 3/0 18IN COATED VICRYLP

## (undated) DEVICE — BANDAGE ADHESIVE PLAS STRL 1X3

## (undated) DEVICE — ADHESIVE DERMABOND MINI HV

## (undated) DEVICE — TRAY SKIN SCRUB WET PREMIUM

## (undated) DEVICE — DRAPE UINDERBUT GRAD PCH

## (undated) DEVICE — SOL NACL IRR 1000ML BTL

## (undated) DEVICE — IRRIGATOR ENDOSCOPY DISP.

## (undated) DEVICE — SOL CLEARIFY VISUALIZATION LAP

## (undated) DEVICE — SOL NS 1000CC

## (undated) DEVICE — SYR 10CC LUER LOCK

## (undated) DEVICE — TROCAR ENDOPATH XCEL 5X100MM

## (undated) DEVICE — DRAPE STERI LONG

## (undated) DEVICE — KIT ANTIFOG

## (undated) DEVICE — SUPPORT ULNA NERVE PROTECTOR

## (undated) DEVICE — NDL INSUF ULTRA VERESS 120MM

## (undated) DEVICE — ELECTRODE REM PLYHSV RETURN 9

## (undated) DEVICE — TROCAR ENDOPATH XCEL 11MM 10CM

## (undated) DEVICE — PACK LAPAROSCOPY/PELVISCOPY II

## (undated) DEVICE — SEALER LIGASURE LAP 37CM 5MM

## (undated) DEVICE — GOWN NONREINF SET-IN SLV XL

## (undated) DEVICE — JELLY SURGILUBE LUBE PKT 3GM